# Patient Record
Sex: FEMALE | Race: WHITE | Employment: OTHER | URBAN - NONMETROPOLITAN AREA
[De-identification: names, ages, dates, MRNs, and addresses within clinical notes are randomized per-mention and may not be internally consistent; named-entity substitution may affect disease eponyms.]

---

## 2017-08-29 ENCOUNTER — HOSPITAL ENCOUNTER (INPATIENT)
Age: 62
LOS: 2 days | Discharge: NURSING, REHAB OR LONG-TERM CARE | DRG: 190 | End: 2017-09-01
Attending: EMERGENCY MEDICINE | Admitting: INTERNAL MEDICINE
Payer: MEDICARE

## 2017-08-29 ENCOUNTER — APPOINTMENT (OUTPATIENT)
Dept: CT IMAGING | Age: 62
DRG: 190 | End: 2017-08-29
Payer: MEDICARE

## 2017-08-29 ENCOUNTER — APPOINTMENT (OUTPATIENT)
Dept: GENERAL RADIOLOGY | Age: 62
DRG: 190 | End: 2017-08-29
Payer: MEDICARE

## 2017-08-29 DIAGNOSIS — J18.9 HCAP (HEALTHCARE-ASSOCIATED PNEUMONIA): Primary | ICD-10-CM

## 2017-08-29 DIAGNOSIS — Z86.59 HISTORY OF DEPRESSION: ICD-10-CM

## 2017-08-29 DIAGNOSIS — E03.9 HYPOTHYROIDISM, UNSPECIFIED TYPE: ICD-10-CM

## 2017-08-29 DIAGNOSIS — E87.20 LACTIC ACIDOSIS: ICD-10-CM

## 2017-08-29 DIAGNOSIS — H91.90 HEARING LOSS, UNSPECIFIED HEARING LOSS TYPE, UNSPECIFIED LATERALITY: ICD-10-CM

## 2017-08-29 DIAGNOSIS — N39.0 URINARY TRACT INFECTION WITHOUT HEMATURIA, SITE UNSPECIFIED: ICD-10-CM

## 2017-08-29 DIAGNOSIS — Z96.21 COCHLEAR IMPLANT IN PLACE: ICD-10-CM

## 2017-08-29 DIAGNOSIS — R10.31 ABDOMINAL PAIN, RIGHT LOWER QUADRANT: ICD-10-CM

## 2017-08-29 LAB
ALBUMIN SERPL-MCNC: 4.4 G/DL (ref 3.5–5.2)
ALP BLD-CCNC: 136 U/L (ref 35–104)
ALT SERPL-CCNC: 34 U/L (ref 5–33)
AMYLASE: 14 U/L (ref 28–100)
ANION GAP SERPL CALCULATED.3IONS-SCNC: 16 MMOL/L (ref 7–19)
AST SERPL-CCNC: 46 U/L (ref 5–32)
BACTERIA: NEGATIVE /HPF
BASE EXCESS ARTERIAL: -1.3 MMOL/L (ref -2–2)
BASOPHILS ABSOLUTE: 0 K/UL (ref 0–0.2)
BASOPHILS RELATIVE PERCENT: 0.2 % (ref 0–1)
BILIRUB SERPL-MCNC: 0.6 MG/DL (ref 0.2–1.2)
BILIRUBIN URINE: NEGATIVE
BLOOD, URINE: NEGATIVE
BUN BLDV-MCNC: 15 MG/DL (ref 8–23)
CALCIUM SERPL-MCNC: 9.5 MG/DL (ref 8.8–10.2)
CARBOXYHEMOGLOBIN ARTERIAL: 2.5 % (ref 0–5)
CHLORIDE BLD-SCNC: 98 MMOL/L (ref 98–111)
CLARITY: CLEAR
CO2: 25 MMOL/L (ref 22–29)
COLOR: YELLOW
CREAT SERPL-MCNC: 0.9 MG/DL (ref 0.5–0.9)
EOSINOPHILS ABSOLUTE: 0 K/UL (ref 0–0.6)
EOSINOPHILS RELATIVE PERCENT: 0.2 % (ref 0–5)
EPITHELIAL CELLS, UA: 1 /HPF (ref 0–5)
GFR NON-AFRICAN AMERICAN: >60
GLUCOSE BLD-MCNC: 121 MG/DL (ref 74–109)
GLUCOSE URINE: NEGATIVE MG/DL
HCO3 ARTERIAL: 21.3 MMOL/L (ref 22–26)
HCT VFR BLD CALC: 37.9 % (ref 37–47)
HEMOGLOBIN, ART, EXTENDED: 10.4 G/DL (ref 12–16)
HEMOGLOBIN: 12.2 G/DL (ref 12–16)
HYALINE CASTS: 0 /HPF (ref 0–8)
KETONES, URINE: NEGATIVE MG/DL
LACTIC ACID: 2.8 MG/DL (ref 0.5–1.9)
LEUKOCYTE ESTERASE, URINE: ABNORMAL
LIPASE: 35 U/L (ref 13–60)
LYMPHOCYTES ABSOLUTE: 1 K/UL (ref 1.1–4.5)
LYMPHOCYTES RELATIVE PERCENT: 12 % (ref 20–40)
MCH RBC QN AUTO: 32.2 PG (ref 27–31)
MCHC RBC AUTO-ENTMCNC: 32.2 G/DL (ref 33–37)
MCV RBC AUTO: 100 FL (ref 81–99)
METHEMOGLOBIN ARTERIAL: 1.1 %
MONOCYTES ABSOLUTE: 0.6 K/UL (ref 0–0.9)
MONOCYTES RELATIVE PERCENT: 7.4 % (ref 0–10)
NEUTROPHILS ABSOLUTE: 6.5 K/UL (ref 1.5–7.5)
NEUTROPHILS RELATIVE PERCENT: 79.5 % (ref 50–65)
NITRITE, URINE: NEGATIVE
O2 CONTENT ARTERIAL: 14 ML/DL
O2 SAT, ARTERIAL: 95.1 %
O2 THERAPY: ABNORMAL
PCO2 ARTERIAL: 28 MMHG (ref 35–45)
PDW BLD-RTO: 13.6 % (ref 11.5–14.5)
PH ARTERIAL: 7.49 (ref 7.35–7.45)
PH UA: 7
PLATELET # BLD: 208 K/UL (ref 130–400)
PMV BLD AUTO: 11.5 FL (ref 9.4–12.3)
PO2 ARTERIAL: 96 MMHG (ref 80–100)
POTASSIUM SERPL-SCNC: 3.7 MMOL/L (ref 3.5–5)
POTASSIUM, WHOLE BLOOD: 3.2
PROTEIN UA: NEGATIVE MG/DL
RBC # BLD: 3.79 M/UL (ref 4.2–5.4)
RBC UA: 2 /HPF (ref 0–4)
SODIUM BLD-SCNC: 139 MMOL/L (ref 136–145)
SPECIFIC GRAVITY UA: 1.02
TOTAL PROTEIN: 7.7 G/DL (ref 6.6–8.7)
TSH SERPL DL<=0.05 MIU/L-ACNC: 62.89 UIU/ML (ref 0.27–4.2)
UROBILINOGEN, URINE: 0.2 E.U./DL
VALPROIC ACID LEVEL: 45.3 UG/ML (ref 50–100)
WBC # BLD: 8.2 K/UL (ref 4.8–10.8)
WBC UA: 16 /HPF (ref 0–5)

## 2017-08-29 PROCEDURE — 87086 URINE CULTURE/COLONY COUNT: CPT

## 2017-08-29 PROCEDURE — 83690 ASSAY OF LIPASE: CPT

## 2017-08-29 PROCEDURE — 93005 ELECTROCARDIOGRAM TRACING: CPT

## 2017-08-29 PROCEDURE — 6370000000 HC RX 637 (ALT 250 FOR IP): Performed by: EMERGENCY MEDICINE

## 2017-08-29 PROCEDURE — 83605 ASSAY OF LACTIC ACID: CPT

## 2017-08-29 PROCEDURE — 99285 EMERGENCY DEPT VISIT HI MDM: CPT

## 2017-08-29 PROCEDURE — 81001 URINALYSIS AUTO W/SCOPE: CPT

## 2017-08-29 PROCEDURE — 6360000002 HC RX W HCPCS: Performed by: EMERGENCY MEDICINE

## 2017-08-29 PROCEDURE — 99284 EMERGENCY DEPT VISIT MOD MDM: CPT | Performed by: EMERGENCY MEDICINE

## 2017-08-29 PROCEDURE — 36415 COLL VENOUS BLD VENIPUNCTURE: CPT

## 2017-08-29 PROCEDURE — 71010 XR CHEST PORTABLE: CPT

## 2017-08-29 PROCEDURE — 2580000003 HC RX 258: Performed by: EMERGENCY MEDICINE

## 2017-08-29 PROCEDURE — 6360000004 HC RX CONTRAST MEDICATION: Performed by: EMERGENCY MEDICINE

## 2017-08-29 PROCEDURE — 36600 WITHDRAWAL OF ARTERIAL BLOOD: CPT

## 2017-08-29 PROCEDURE — 80164 ASSAY DIPROPYLACETIC ACD TOT: CPT

## 2017-08-29 PROCEDURE — 80053 COMPREHEN METABOLIC PANEL: CPT

## 2017-08-29 PROCEDURE — 85025 COMPLETE CBC W/AUTO DIFF WBC: CPT

## 2017-08-29 PROCEDURE — 74177 CT ABD & PELVIS W/CONTRAST: CPT

## 2017-08-29 PROCEDURE — 84443 ASSAY THYROID STIM HORMONE: CPT

## 2017-08-29 PROCEDURE — 82150 ASSAY OF AMYLASE: CPT

## 2017-08-29 RX ORDER — VANCOMYCIN HYDROCHLORIDE 1 G/200ML
1 INJECTION, SOLUTION INTRAVENOUS ONCE
Status: DISCONTINUED | OUTPATIENT
Start: 2017-08-29 | End: 2017-08-30 | Stop reason: DRUGHIGH

## 2017-08-29 RX ORDER — 0.9 % SODIUM CHLORIDE 0.9 %
1000 INTRAVENOUS SOLUTION INTRAVENOUS ONCE
Status: COMPLETED | OUTPATIENT
Start: 2017-08-29 | End: 2017-08-29

## 2017-08-29 RX ORDER — ACETAMINOPHEN 500 MG
1000 TABLET ORAL ONCE
Status: COMPLETED | OUTPATIENT
Start: 2017-08-29 | End: 2017-08-29

## 2017-08-29 RX ORDER — CIPROFLOXACIN 2 MG/ML
400 INJECTION, SOLUTION INTRAVENOUS EVERY 12 HOURS
Status: DISCONTINUED | OUTPATIENT
Start: 2017-08-29 | End: 2017-09-01 | Stop reason: HOSPADM

## 2017-08-29 RX ADMIN — SODIUM CHLORIDE 1000 ML: 9 INJECTION, SOLUTION INTRAVENOUS at 20:54

## 2017-08-29 RX ADMIN — ACETAMINOPHEN 1000 MG: 500 TABLET ORAL at 23:46

## 2017-08-29 RX ADMIN — IOVERSOL 90 ML: 741 INJECTION INTRA-ARTERIAL; INTRAVENOUS at 21:03

## 2017-08-29 RX ADMIN — TAZOBACTAM SODIUM AND PIPERACILLIN SODIUM 3.38 G: 375; 3 INJECTION, SOLUTION INTRAVENOUS at 23:49

## 2017-08-29 ASSESSMENT — ENCOUNTER SYMPTOMS
CONSTIPATION: 0
VOICE CHANGE: 0
SORE THROAT: 0
ABDOMINAL PAIN: 1
FACIAL SWELLING: 0
CHOKING: 0
DIARRHEA: 0
APNEA: 0
SINUS PRESSURE: 0
BLOOD IN STOOL: 0
NAUSEA: 0
EYE DISCHARGE: 0

## 2017-08-29 ASSESSMENT — PAIN SCALES - GENERAL: PAINLEVEL_OUTOF10: 4

## 2017-08-30 PROBLEM — R82.81 PYURIA: Status: ACTIVE | Noted: 2017-08-30

## 2017-08-30 PROBLEM — J18.9 PNEUMONIA OF LEFT LOWER LOBE DUE TO INFECTIOUS ORGANISM: Status: ACTIVE | Noted: 2017-08-30

## 2017-08-30 PROBLEM — E03.4 HYPOTHYROIDISM DUE TO ACQUIRED ATROPHY OF THYROID: Status: ACTIVE | Noted: 2017-08-30

## 2017-08-30 PROBLEM — J18.9 PNEUMONIA: Status: ACTIVE | Noted: 2017-08-30

## 2017-08-30 LAB — LACTIC ACID: 1 MG/DL (ref 0.5–1.9)

## 2017-08-30 PROCEDURE — 6360000002 HC RX W HCPCS: Performed by: EMERGENCY MEDICINE

## 2017-08-30 PROCEDURE — 6370000000 HC RX 637 (ALT 250 FOR IP): Performed by: INTERNAL MEDICINE

## 2017-08-30 PROCEDURE — 2580000003 HC RX 258: Performed by: INTERNAL MEDICINE

## 2017-08-30 PROCEDURE — 87070 CULTURE OTHR SPECIMN AEROBIC: CPT

## 2017-08-30 PROCEDURE — 99223 1ST HOSP IP/OBS HIGH 75: CPT | Performed by: INTERNAL MEDICINE

## 2017-08-30 PROCEDURE — 86403 PARTICLE AGGLUT ANTBDY SCRN: CPT

## 2017-08-30 PROCEDURE — 6360000002 HC RX W HCPCS: Performed by: INTERNAL MEDICINE

## 2017-08-30 PROCEDURE — 87040 BLOOD CULTURE FOR BACTERIA: CPT

## 2017-08-30 PROCEDURE — 83605 ASSAY OF LACTIC ACID: CPT

## 2017-08-30 PROCEDURE — 99999 PR OFFICE/OUTPT VISIT,PROCEDURE ONLY: CPT | Performed by: INTERNAL MEDICINE

## 2017-08-30 PROCEDURE — 36415 COLL VENOUS BLD VENIPUNCTURE: CPT

## 2017-08-30 PROCEDURE — 84132 ASSAY OF SERUM POTASSIUM: CPT

## 2017-08-30 PROCEDURE — G8996 SWALLOW CURRENT STATUS: HCPCS

## 2017-08-30 PROCEDURE — 92610 EVALUATE SWALLOWING FUNCTION: CPT

## 2017-08-30 PROCEDURE — 2700000000 HC OXYGEN THERAPY PER DAY

## 2017-08-30 PROCEDURE — 87185 SC STD ENZYME DETCJ PER NZM: CPT

## 2017-08-30 PROCEDURE — 82803 BLOOD GASES ANY COMBINATION: CPT

## 2017-08-30 PROCEDURE — G8997 SWALLOW GOAL STATUS: HCPCS

## 2017-08-30 PROCEDURE — 87205 SMEAR GRAM STAIN: CPT

## 2017-08-30 PROCEDURE — 1210000000 HC MED SURG R&B

## 2017-08-30 RX ORDER — FLUTICASONE PROPIONATE 50 MCG
2 SPRAY, SUSPENSION (ML) NASAL DAILY
Status: DISCONTINUED | OUTPATIENT
Start: 2017-08-30 | End: 2017-09-01 | Stop reason: HOSPADM

## 2017-08-30 RX ORDER — ALENDRONATE SODIUM 70 MG/1
70 TABLET ORAL
Status: ON HOLD | COMMUNITY
End: 2018-01-25 | Stop reason: HOSPADM

## 2017-08-30 RX ORDER — DULOXETIN HYDROCHLORIDE 30 MG/1
90 CAPSULE, DELAYED RELEASE ORAL DAILY
Status: DISCONTINUED | OUTPATIENT
Start: 2017-08-30 | End: 2017-09-01 | Stop reason: HOSPADM

## 2017-08-30 RX ORDER — GABAPENTIN 300 MG/1
600 CAPSULE ORAL 3 TIMES DAILY
Status: DISCONTINUED | OUTPATIENT
Start: 2017-08-30 | End: 2017-09-01 | Stop reason: HOSPADM

## 2017-08-30 RX ORDER — ONDANSETRON 2 MG/ML
4 INJECTION INTRAMUSCULAR; INTRAVENOUS EVERY 6 HOURS PRN
Status: DISCONTINUED | OUTPATIENT
Start: 2017-08-30 | End: 2017-09-01 | Stop reason: HOSPADM

## 2017-08-30 RX ORDER — ACETAMINOPHEN 325 MG/1
650 TABLET ORAL EVERY 4 HOURS PRN
Status: DISCONTINUED | OUTPATIENT
Start: 2017-08-30 | End: 2017-09-01 | Stop reason: HOSPADM

## 2017-08-30 RX ORDER — TRAMADOL HYDROCHLORIDE 50 MG/1
50 TABLET ORAL EVERY 6 HOURS PRN
Status: ON HOLD | COMMUNITY
End: 2017-09-01

## 2017-08-30 RX ORDER — DIVALPROEX SODIUM 500 MG/1
500 TABLET, EXTENDED RELEASE ORAL EVERY 12 HOURS SCHEDULED
Status: DISCONTINUED | OUTPATIENT
Start: 2017-08-30 | End: 2017-09-01 | Stop reason: HOSPADM

## 2017-08-30 RX ORDER — SODIUM CHLORIDE 0.9 % (FLUSH) 0.9 %
10 SYRINGE (ML) INJECTION PRN
Status: DISCONTINUED | OUTPATIENT
Start: 2017-08-30 | End: 2017-09-01 | Stop reason: HOSPADM

## 2017-08-30 RX ORDER — SERTRALINE HYDROCHLORIDE 100 MG/1
100 TABLET, FILM COATED ORAL DAILY
Status: ON HOLD | COMMUNITY
End: 2017-09-01 | Stop reason: HOSPADM

## 2017-08-30 RX ORDER — SODIUM CHLORIDE 0.9 % (FLUSH) 0.9 %
10 SYRINGE (ML) INJECTION EVERY 12 HOURS SCHEDULED
Status: DISCONTINUED | OUTPATIENT
Start: 2017-08-30 | End: 2017-09-01 | Stop reason: HOSPADM

## 2017-08-30 RX ORDER — LEVOTHYROXINE SODIUM 0.1 MG/1
100 TABLET ORAL DAILY
Status: DISCONTINUED | OUTPATIENT
Start: 2017-08-30 | End: 2017-09-01 | Stop reason: HOSPADM

## 2017-08-30 RX ORDER — FOLIC ACID 1 MG/1
1000 TABLET ORAL DAILY
Status: DISCONTINUED | OUTPATIENT
Start: 2017-08-30 | End: 2017-09-01 | Stop reason: HOSPADM

## 2017-08-30 RX ORDER — TRAMADOL HYDROCHLORIDE 50 MG/1
50 TABLET ORAL EVERY 6 HOURS PRN
Status: DISCONTINUED | OUTPATIENT
Start: 2017-08-30 | End: 2017-09-01 | Stop reason: HOSPADM

## 2017-08-30 RX ORDER — TRAZODONE HYDROCHLORIDE 50 MG/1
50 TABLET ORAL NIGHTLY PRN
Status: DISCONTINUED | OUTPATIENT
Start: 2017-08-30 | End: 2017-09-01 | Stop reason: HOSPADM

## 2017-08-30 RX ORDER — SODIUM CHLORIDE AND POTASSIUM CHLORIDE .9; .15 G/100ML; G/100ML
SOLUTION INTRAVENOUS CONTINUOUS
Status: DISPENSED | OUTPATIENT
Start: 2017-08-30 | End: 2017-08-31

## 2017-08-30 RX ORDER — ALENDRONATE SODIUM 70 MG/1
70 TABLET ORAL
Status: DISCONTINUED | OUTPATIENT
Start: 2017-08-30 | End: 2017-08-30 | Stop reason: CLARIF

## 2017-08-30 RX ORDER — CLONAZEPAM 1 MG/1
1 TABLET ORAL 3 TIMES DAILY
Status: DISCONTINUED | OUTPATIENT
Start: 2017-08-30 | End: 2017-09-01 | Stop reason: HOSPADM

## 2017-08-30 RX ORDER — VANCOMYCIN HYDROCHLORIDE 1 G/200ML
1000 INJECTION, SOLUTION INTRAVENOUS EVERY 24 HOURS
Status: DISCONTINUED | OUTPATIENT
Start: 2017-08-30 | End: 2017-09-01 | Stop reason: HOSPADM

## 2017-08-30 RX ORDER — FENOFIBRATE 145 MG/1
145 TABLET, COATED ORAL DAILY
COMMUNITY
End: 2018-01-23

## 2017-08-30 RX ORDER — ATORVASTATIN CALCIUM 40 MG/1
40 TABLET, FILM COATED ORAL DAILY
Status: DISCONTINUED | OUTPATIENT
Start: 2017-08-30 | End: 2017-09-01 | Stop reason: HOSPADM

## 2017-08-30 RX ORDER — CYCLOBENZAPRINE HCL 10 MG
10 TABLET ORAL DAILY
Status: DISCONTINUED | OUTPATIENT
Start: 2017-08-30 | End: 2017-09-01 | Stop reason: HOSPADM

## 2017-08-30 RX ADMIN — SODIUM CHLORIDE AND POTASSIUM CHLORIDE: 9; 1.49 INJECTION, SOLUTION INTRAVENOUS at 20:03

## 2017-08-30 RX ADMIN — CIPROFLOXACIN 400 MG: 2 INJECTION, SOLUTION INTRAVENOUS at 12:56

## 2017-08-30 RX ADMIN — LEVOTHYROXINE SODIUM 100 MCG: 100 TABLET ORAL at 06:44

## 2017-08-30 RX ADMIN — ATORVASTATIN CALCIUM 40 MG: 40 TABLET, FILM COATED ORAL at 10:29

## 2017-08-30 RX ADMIN — GABAPENTIN 600 MG: 300 CAPSULE ORAL at 10:29

## 2017-08-30 RX ADMIN — FOLIC ACID 1000 MCG: 1 TABLET ORAL at 10:29

## 2017-08-30 RX ADMIN — CLONAZEPAM 1 MG: 1 TABLET ORAL at 14:47

## 2017-08-30 RX ADMIN — TAZOBACTAM SODIUM AND PIPERACILLIN SODIUM 3.38 G: 375; 3 INJECTION, SOLUTION INTRAVENOUS at 06:10

## 2017-08-30 RX ADMIN — CLONAZEPAM 1 MG: 1 TABLET ORAL at 10:29

## 2017-08-30 RX ADMIN — FLUTICASONE PROPIONATE 2 SPRAY: 50 SPRAY, METERED NASAL at 10:27

## 2017-08-30 RX ADMIN — DULOXETINE HYDROCHLORIDE 90 MG: 30 CAPSULE, DELAYED RELEASE ORAL at 10:29

## 2017-08-30 RX ADMIN — ACETAMINOPHEN 650 MG: 325 TABLET, FILM COATED ORAL at 04:47

## 2017-08-30 RX ADMIN — GABAPENTIN 600 MG: 300 CAPSULE ORAL at 20:23

## 2017-08-30 RX ADMIN — DIVALPROEX SODIUM 500 MG: 500 TABLET, FILM COATED, EXTENDED RELEASE ORAL at 20:23

## 2017-08-30 RX ADMIN — SODIUM CHLORIDE AND POTASSIUM CHLORIDE: 9; 1.49 INJECTION, SOLUTION INTRAVENOUS at 10:26

## 2017-08-30 RX ADMIN — SERTRALINE HYDROCHLORIDE 100 MG: 50 TABLET ORAL at 10:29

## 2017-08-30 RX ADMIN — ENOXAPARIN SODIUM 40 MG: 40 INJECTION SUBCUTANEOUS at 10:28

## 2017-08-30 RX ADMIN — GABAPENTIN 600 MG: 300 CAPSULE ORAL at 14:47

## 2017-08-30 RX ADMIN — VANCOMYCIN HYDROCHLORIDE 1000 MG: 1 INJECTION, SOLUTION INTRAVENOUS at 04:00

## 2017-08-30 RX ADMIN — IBUPROFEN 600 MG: 200 TABLET, FILM COATED ORAL at 06:43

## 2017-08-30 RX ADMIN — CIPROFLOXACIN 400 MG: 2 INJECTION, SOLUTION INTRAVENOUS at 00:56

## 2017-08-30 RX ADMIN — CEFEPIME 2 G: 2 INJECTION, POWDER, FOR SOLUTION INTRAMUSCULAR; INTRAVENOUS at 17:00

## 2017-08-30 RX ADMIN — DIVALPROEX SODIUM 500 MG: 500 TABLET, FILM COATED, EXTENDED RELEASE ORAL at 10:28

## 2017-08-30 RX ADMIN — CEFEPIME 2 G: 2 INJECTION, POWDER, FOR SOLUTION INTRAMUSCULAR; INTRAVENOUS at 05:31

## 2017-08-30 RX ADMIN — CLONAZEPAM 1 MG: 1 TABLET ORAL at 20:24

## 2017-08-30 RX ADMIN — TRAZODONE HYDROCHLORIDE 50 MG: 50 TABLET ORAL at 21:36

## 2017-08-30 RX ADMIN — CYCLOBENZAPRINE HYDROCHLORIDE 10 MG: 10 TABLET, FILM COATED ORAL at 10:29

## 2017-08-30 ASSESSMENT — PAIN SCALES - GENERAL
PAINLEVEL_OUTOF10: 0
PAINLEVEL_OUTOF10: 4

## 2017-08-31 LAB
ANION GAP SERPL CALCULATED.3IONS-SCNC: 11 MMOL/L (ref 7–19)
BASOPHILS ABSOLUTE: 0 K/UL (ref 0–0.2)
BASOPHILS RELATIVE PERCENT: 0.3 % (ref 0–1)
BUN BLDV-MCNC: 9 MG/DL (ref 8–23)
CALCIUM SERPL-MCNC: 8.7 MG/DL (ref 8.8–10.2)
CHLORIDE BLD-SCNC: 111 MMOL/L (ref 98–111)
CO2: 22 MMOL/L (ref 22–29)
CREAT SERPL-MCNC: 0.8 MG/DL (ref 0.5–0.9)
EOSINOPHILS ABSOLUTE: 0.1 K/UL (ref 0–0.6)
EOSINOPHILS RELATIVE PERCENT: 1 % (ref 0–5)
GFR NON-AFRICAN AMERICAN: >60
GLUCOSE BLD-MCNC: 78 MG/DL (ref 74–109)
HCT VFR BLD CALC: 28.5 % (ref 37–47)
HEMOGLOBIN: 9.1 G/DL (ref 12–16)
LYMPHOCYTES ABSOLUTE: 2 K/UL (ref 1.1–4.5)
LYMPHOCYTES RELATIVE PERCENT: 25.5 % (ref 20–40)
MCH RBC QN AUTO: 31.4 PG (ref 27–31)
MCHC RBC AUTO-ENTMCNC: 31.9 G/DL (ref 33–37)
MCV RBC AUTO: 98.3 FL (ref 81–99)
MONOCYTES ABSOLUTE: 0.6 K/UL (ref 0–0.9)
MONOCYTES RELATIVE PERCENT: 7.8 % (ref 0–10)
NEUTROPHILS ABSOLUTE: 5.1 K/UL (ref 1.5–7.5)
NEUTROPHILS RELATIVE PERCENT: 64.8 % (ref 50–65)
PDW BLD-RTO: 14.1 % (ref 11.5–14.5)
PLATELET # BLD: 145 K/UL (ref 130–400)
PMV BLD AUTO: 10.6 FL (ref 9.4–12.3)
POTASSIUM SERPL-SCNC: 4 MMOL/L (ref 3.5–5)
RBC # BLD: 2.9 M/UL (ref 4.2–5.4)
SODIUM BLD-SCNC: 144 MMOL/L (ref 136–145)
URINE CULTURE, ROUTINE: NORMAL
WBC # BLD: 7.9 K/UL (ref 4.8–10.8)

## 2017-08-31 PROCEDURE — 92526 ORAL FUNCTION THERAPY: CPT

## 2017-08-31 PROCEDURE — 2700000000 HC OXYGEN THERAPY PER DAY

## 2017-08-31 PROCEDURE — 2580000003 HC RX 258: Performed by: INTERNAL MEDICINE

## 2017-08-31 PROCEDURE — 85025 COMPLETE CBC W/AUTO DIFF WBC: CPT

## 2017-08-31 PROCEDURE — 80048 BASIC METABOLIC PNL TOTAL CA: CPT

## 2017-08-31 PROCEDURE — 6360000002 HC RX W HCPCS: Performed by: INTERNAL MEDICINE

## 2017-08-31 PROCEDURE — 6360000002 HC RX W HCPCS: Performed by: EMERGENCY MEDICINE

## 2017-08-31 PROCEDURE — 1210000000 HC MED SURG R&B

## 2017-08-31 PROCEDURE — G8979 MOBILITY GOAL STATUS: HCPCS

## 2017-08-31 PROCEDURE — 99232 SBSQ HOSP IP/OBS MODERATE 35: CPT | Performed by: INTERNAL MEDICINE

## 2017-08-31 PROCEDURE — 97162 PT EVAL MOD COMPLEX 30 MIN: CPT

## 2017-08-31 PROCEDURE — G8978 MOBILITY CURRENT STATUS: HCPCS

## 2017-08-31 PROCEDURE — 6370000000 HC RX 637 (ALT 250 FOR IP): Performed by: INTERNAL MEDICINE

## 2017-08-31 PROCEDURE — 36415 COLL VENOUS BLD VENIPUNCTURE: CPT

## 2017-08-31 RX ADMIN — CIPROFLOXACIN 400 MG: 2 INJECTION, SOLUTION INTRAVENOUS at 12:41

## 2017-08-31 RX ADMIN — GABAPENTIN 600 MG: 300 CAPSULE ORAL at 13:24

## 2017-08-31 RX ADMIN — ENOXAPARIN SODIUM 40 MG: 40 INJECTION SUBCUTANEOUS at 09:01

## 2017-08-31 RX ADMIN — CLONAZEPAM 1 MG: 1 TABLET ORAL at 09:01

## 2017-08-31 RX ADMIN — CEFEPIME 2 G: 2 INJECTION, POWDER, FOR SOLUTION INTRAMUSCULAR; INTRAVENOUS at 15:51

## 2017-08-31 RX ADMIN — SERTRALINE HYDROCHLORIDE 100 MG: 50 TABLET ORAL at 09:01

## 2017-08-31 RX ADMIN — Medication 10 ML: at 20:52

## 2017-08-31 RX ADMIN — CIPROFLOXACIN 400 MG: 2 INJECTION, SOLUTION INTRAVENOUS at 00:22

## 2017-08-31 RX ADMIN — TRAMADOL HYDROCHLORIDE 50 MG: 50 TABLET, FILM COATED ORAL at 04:24

## 2017-08-31 RX ADMIN — CLONAZEPAM 1 MG: 1 TABLET ORAL at 13:24

## 2017-08-31 RX ADMIN — VANCOMYCIN HYDROCHLORIDE 1000 MG: 1 INJECTION, SOLUTION INTRAVENOUS at 03:29

## 2017-08-31 RX ADMIN — ATORVASTATIN CALCIUM 40 MG: 40 TABLET, FILM COATED ORAL at 09:02

## 2017-08-31 RX ADMIN — FOLIC ACID 1000 MCG: 1 TABLET ORAL at 09:02

## 2017-08-31 RX ADMIN — LEVOTHYROXINE SODIUM 100 MCG: 100 TABLET ORAL at 04:24

## 2017-08-31 RX ADMIN — TRAMADOL HYDROCHLORIDE 50 MG: 50 TABLET, FILM COATED ORAL at 13:25

## 2017-08-31 RX ADMIN — CYCLOBENZAPRINE HYDROCHLORIDE 10 MG: 10 TABLET, FILM COATED ORAL at 09:02

## 2017-08-31 RX ADMIN — GABAPENTIN 600 MG: 300 CAPSULE ORAL at 09:01

## 2017-08-31 RX ADMIN — DIVALPROEX SODIUM 500 MG: 500 TABLET, FILM COATED, EXTENDED RELEASE ORAL at 09:02

## 2017-08-31 RX ADMIN — CLONAZEPAM 1 MG: 1 TABLET ORAL at 20:50

## 2017-08-31 RX ADMIN — TRAMADOL HYDROCHLORIDE 50 MG: 50 TABLET, FILM COATED ORAL at 20:50

## 2017-08-31 RX ADMIN — DULOXETINE HYDROCHLORIDE 90 MG: 30 CAPSULE, DELAYED RELEASE ORAL at 09:01

## 2017-08-31 RX ADMIN — CEFEPIME 2 G: 2 INJECTION, POWDER, FOR SOLUTION INTRAMUSCULAR; INTRAVENOUS at 05:03

## 2017-08-31 RX ADMIN — DIVALPROEX SODIUM 500 MG: 500 TABLET, FILM COATED, EXTENDED RELEASE ORAL at 20:51

## 2017-08-31 RX ADMIN — GABAPENTIN 600 MG: 300 CAPSULE ORAL at 20:51

## 2017-08-31 ASSESSMENT — PAIN DESCRIPTION - ORIENTATION: ORIENTATION: RIGHT

## 2017-08-31 ASSESSMENT — PAIN DESCRIPTION - LOCATION: LOCATION: BACK;HIP

## 2017-08-31 ASSESSMENT — PAIN SCALES - GENERAL
PAINLEVEL_OUTOF10: 2
PAINLEVEL_OUTOF10: 8
PAINLEVEL_OUTOF10: 6
PAINLEVEL_OUTOF10: 4
PAINLEVEL_OUTOF10: 6
PAINLEVEL_OUTOF10: 8

## 2017-08-31 ASSESSMENT — PAIN DESCRIPTION - PAIN TYPE: TYPE: CHRONIC PAIN

## 2017-09-01 VITALS
SYSTOLIC BLOOD PRESSURE: 100 MMHG | DIASTOLIC BLOOD PRESSURE: 72 MMHG | WEIGHT: 150 LBS | HEIGHT: 64 IN | TEMPERATURE: 97.2 F | HEART RATE: 74 BPM | RESPIRATION RATE: 16 BRPM | BODY MASS INDEX: 25.61 KG/M2 | OXYGEN SATURATION: 96 %

## 2017-09-01 PROBLEM — D64.9 ANEMIA: Status: ACTIVE | Noted: 2017-09-01

## 2017-09-01 PROBLEM — I95.9 HYPOTENSION: Status: ACTIVE | Noted: 2017-09-01

## 2017-09-01 LAB — STREP PNEUMONIAE ANTIGEN, URINE: NORMAL

## 2017-09-01 PROCEDURE — 6360000002 HC RX W HCPCS: Performed by: INTERNAL MEDICINE

## 2017-09-01 PROCEDURE — 2580000003 HC RX 258: Performed by: INTERNAL MEDICINE

## 2017-09-01 PROCEDURE — 6370000000 HC RX 637 (ALT 250 FOR IP): Performed by: INTERNAL MEDICINE

## 2017-09-01 PROCEDURE — 6360000002 HC RX W HCPCS: Performed by: EMERGENCY MEDICINE

## 2017-09-01 PROCEDURE — 87449 NOS EACH ORGANISM AG IA: CPT

## 2017-09-01 PROCEDURE — 99239 HOSP IP/OBS DSCHRG MGMT >30: CPT | Performed by: INTERNAL MEDICINE

## 2017-09-01 RX ORDER — LEVOTHYROXINE SODIUM 0.1 MG/1
100 TABLET ORAL DAILY
Qty: 30 TABLET | Refills: 3 | Status: SHIPPED | OUTPATIENT
Start: 2017-09-01

## 2017-09-01 RX ORDER — LOPERAMIDE HYDROCHLORIDE 2 MG/1
2 CAPSULE ORAL 4 TIMES DAILY PRN
Qty: 20 CAPSULE | Refills: 0 | Status: SHIPPED | OUTPATIENT
Start: 2017-09-01 | End: 2018-01-23

## 2017-09-01 RX ORDER — GABAPENTIN 300 MG/1
600 CAPSULE ORAL 3 TIMES DAILY
Qty: 60 CAPSULE | Refills: 0 | Status: ON HOLD | OUTPATIENT
Start: 2017-09-01 | End: 2018-01-25

## 2017-09-01 RX ORDER — CLONAZEPAM 1 MG/1
1 TABLET ORAL 3 TIMES DAILY
Qty: 60 TABLET | Refills: 0 | Status: SHIPPED | OUTPATIENT
Start: 2017-09-01 | End: 2017-11-04

## 2017-09-01 RX ORDER — LEVOFLOXACIN 500 MG/1
500 TABLET, FILM COATED ORAL DAILY
Qty: 20 TABLET | Refills: 0 | Status: SHIPPED | OUTPATIENT
Start: 2017-09-01 | End: 2017-09-11

## 2017-09-01 RX ORDER — TRAMADOL HYDROCHLORIDE 50 MG/1
50 TABLET ORAL EVERY 6 HOURS PRN
Qty: 20 TABLET | Refills: 0 | Status: SHIPPED | OUTPATIENT
Start: 2017-09-01 | End: 2018-01-23

## 2017-09-01 RX ADMIN — DIVALPROEX SODIUM 500 MG: 500 TABLET, FILM COATED, EXTENDED RELEASE ORAL at 08:25

## 2017-09-01 RX ADMIN — DULOXETINE HYDROCHLORIDE 90 MG: 30 CAPSULE, DELAYED RELEASE ORAL at 08:25

## 2017-09-01 RX ADMIN — GABAPENTIN 600 MG: 300 CAPSULE ORAL at 08:25

## 2017-09-01 RX ADMIN — FLUTICASONE PROPIONATE 2 SPRAY: 50 SPRAY, METERED NASAL at 08:33

## 2017-09-01 RX ADMIN — CYCLOBENZAPRINE HYDROCHLORIDE 10 MG: 10 TABLET, FILM COATED ORAL at 08:25

## 2017-09-01 RX ADMIN — CLONAZEPAM 1 MG: 1 TABLET ORAL at 08:25

## 2017-09-01 RX ADMIN — ATORVASTATIN CALCIUM 40 MG: 40 TABLET, FILM COATED ORAL at 08:25

## 2017-09-01 RX ADMIN — CEFEPIME 2 G: 2 INJECTION, POWDER, FOR SOLUTION INTRAMUSCULAR; INTRAVENOUS at 04:23

## 2017-09-01 RX ADMIN — SERTRALINE HYDROCHLORIDE 100 MG: 50 TABLET ORAL at 08:25

## 2017-09-01 RX ADMIN — Medication 10 ML: at 08:30

## 2017-09-01 RX ADMIN — LEVOTHYROXINE SODIUM 100 MCG: 100 TABLET ORAL at 05:55

## 2017-09-01 RX ADMIN — ENOXAPARIN SODIUM 40 MG: 40 INJECTION SUBCUTANEOUS at 08:26

## 2017-09-01 RX ADMIN — VANCOMYCIN HYDROCHLORIDE 1000 MG: 1 INJECTION, SOLUTION INTRAVENOUS at 03:16

## 2017-09-01 RX ADMIN — FOLIC ACID 1000 MCG: 1 TABLET ORAL at 08:25

## 2017-09-01 RX ADMIN — TRAMADOL HYDROCHLORIDE 50 MG: 50 TABLET, FILM COATED ORAL at 05:57

## 2017-09-01 RX ADMIN — CIPROFLOXACIN 400 MG: 2 INJECTION, SOLUTION INTRAVENOUS at 00:16

## 2017-09-01 ASSESSMENT — PAIN SCALES - GENERAL: PAINLEVEL_OUTOF10: 6

## 2017-09-04 LAB
BLOOD CULTURE, ROUTINE: NORMAL
CULTURE, BLOOD 2: NORMAL
CULTURE, RESPIRATORY: ABNORMAL
GRAM STAIN RESULT: ABNORMAL
ORGANISM: ABNORMAL
ORGANISM: ABNORMAL

## 2017-09-06 LAB
EKG P AXIS: 78 DEGREES
EKG P-R INTERVAL: 137 MS
EKG Q-T INTERVAL: 299 MS
EKG QRS DURATION: 72 MS
EKG QTC CALCULATION (BAZETT): 449 MS
EKG T AXIS: 75 DEGREES

## 2017-11-04 ENCOUNTER — HOSPITAL ENCOUNTER (EMERGENCY)
Age: 62
Discharge: HOME | End: 2017-11-04
Attending: EMERGENCY MEDICINE
Payer: MEDICARE

## 2017-11-04 ENCOUNTER — APPOINTMENT (OUTPATIENT)
Dept: CT IMAGING | Age: 62
End: 2017-11-04
Payer: MEDICARE

## 2017-11-04 VITALS
SYSTOLIC BLOOD PRESSURE: 110 MMHG | DIASTOLIC BLOOD PRESSURE: 62 MMHG | RESPIRATION RATE: 18 BRPM | HEART RATE: 83 BPM | TEMPERATURE: 98.8 F | OXYGEN SATURATION: 93 %

## 2017-11-04 DIAGNOSIS — R74.8 ALKALINE PHOSPHATASE ELEVATION: Primary | ICD-10-CM

## 2017-11-04 DIAGNOSIS — R46.89 AGGRESSIVE BEHAVIOR: ICD-10-CM

## 2017-11-04 DIAGNOSIS — R44.3 HALLUCINATIONS: ICD-10-CM

## 2017-11-04 DIAGNOSIS — R74.8 LIVER ENZYME ELEVATION: ICD-10-CM

## 2017-11-04 LAB
ACETAMINOPHEN LEVEL: <15 UG/ML
ALBUMIN SERPL-MCNC: 4.3 G/DL (ref 3.5–5.2)
ALP BLD-CCNC: 167 U/L (ref 35–104)
ALT SERPL-CCNC: 22 U/L (ref 5–33)
AMPHETAMINE SCREEN, URINE: NEGATIVE
ANION GAP SERPL CALCULATED.3IONS-SCNC: 13 MMOL/L (ref 7–19)
AST SERPL-CCNC: 39 U/L (ref 5–32)
BARBITURATE SCREEN URINE: NEGATIVE
BENZODIAZEPINE SCREEN, URINE: NEGATIVE
BILIRUB SERPL-MCNC: 0.3 MG/DL (ref 0.2–1.2)
BILIRUBIN URINE: NEGATIVE
BLOOD, URINE: NEGATIVE
BUN BLDV-MCNC: 13 MG/DL (ref 8–23)
CALCIUM SERPL-MCNC: 10.2 MG/DL (ref 8.8–10.2)
CANNABINOID SCREEN URINE: NEGATIVE
CHLORIDE BLD-SCNC: 98 MMOL/L (ref 98–111)
CLARITY: CLEAR
CO2: 31 MMOL/L (ref 22–29)
COCAINE METABOLITE SCREEN URINE: NEGATIVE
COLOR: YELLOW
CREAT SERPL-MCNC: 0.8 MG/DL (ref 0.5–0.9)
ETHANOL: <10 MG/DL (ref 0–0.08)
GFR NON-AFRICAN AMERICAN: >60
GLUCOSE BLD-MCNC: 86 MG/DL (ref 74–109)
GLUCOSE URINE: NEGATIVE MG/DL
HCT VFR BLD CALC: 37.4 % (ref 37–47)
HEMOGLOBIN: 12.2 G/DL (ref 12–16)
KETONES, URINE: NEGATIVE MG/DL
LEUKOCYTE ESTERASE, URINE: NEGATIVE
Lab: NORMAL
MCH RBC QN AUTO: 31 PG (ref 27–31)
MCHC RBC AUTO-ENTMCNC: 32.6 G/DL (ref 33–37)
MCV RBC AUTO: 94.9 FL (ref 81–99)
NITRITE, URINE: NEGATIVE
OPIATE SCREEN URINE: NEGATIVE
PDW BLD-RTO: 13.4 % (ref 11.5–14.5)
PH UA: 7
PLATELET # BLD: 182 K/UL (ref 130–400)
PMV BLD AUTO: 9.8 FL (ref 9.4–12.3)
POTASSIUM SERPL-SCNC: 4.9 MMOL/L (ref 3.5–5)
PROTEIN UA: NEGATIVE MG/DL
RBC # BLD: 3.94 M/UL (ref 4.2–5.4)
SALICYLATE, SERUM: <3 MG/DL (ref 3–10)
SODIUM BLD-SCNC: 142 MMOL/L (ref 136–145)
SPECIFIC GRAVITY UA: 1.01
TOTAL PROTEIN: 7.7 G/DL (ref 6.6–8.7)
UROBILINOGEN, URINE: 0.2 E.U./DL
VALPROIC ACID LEVEL: 37.6 UG/ML (ref 50–100)
WBC # BLD: 4.9 K/UL (ref 4.8–10.8)

## 2017-11-04 PROCEDURE — G0480 DRUG TEST DEF 1-7 CLASSES: HCPCS

## 2017-11-04 PROCEDURE — 85027 COMPLETE CBC AUTOMATED: CPT

## 2017-11-04 PROCEDURE — 81003 URINALYSIS AUTO W/O SCOPE: CPT

## 2017-11-04 PROCEDURE — 80053 COMPREHEN METABOLIC PANEL: CPT

## 2017-11-04 PROCEDURE — 70450 CT HEAD/BRAIN W/O DYE: CPT

## 2017-11-04 PROCEDURE — 99284 EMERGENCY DEPT VISIT MOD MDM: CPT | Performed by: EMERGENCY MEDICINE

## 2017-11-04 PROCEDURE — 99285 EMERGENCY DEPT VISIT HI MDM: CPT

## 2017-11-04 PROCEDURE — 80307 DRUG TEST PRSMV CHEM ANLYZR: CPT

## 2017-11-04 PROCEDURE — 80164 ASSAY DIPROPYLACETIC ACD TOT: CPT

## 2017-11-04 PROCEDURE — 36415 COLL VENOUS BLD VENIPUNCTURE: CPT

## 2017-11-04 RX ORDER — ALBUTEROL SULFATE 90 UG/1
2 AEROSOL, METERED RESPIRATORY (INHALATION) 4 TIMES DAILY
COMMUNITY

## 2017-11-04 RX ORDER — SERTRALINE HYDROCHLORIDE 100 MG/1
100 TABLET, FILM COATED ORAL DAILY
COMMUNITY

## 2017-11-04 RX ORDER — ALBUTEROL SULFATE 2.5 MG/3ML
2.5 SOLUTION RESPIRATORY (INHALATION) 4 TIMES DAILY
COMMUNITY
End: 2018-01-23

## 2017-11-04 RX ORDER — ONDANSETRON 4 MG/1
4 TABLET, ORALLY DISINTEGRATING ORAL 3 TIMES DAILY PRN
COMMUNITY
End: 2018-01-23

## 2017-11-04 RX ORDER — CLONAZEPAM 0.5 MG/1
0.5 TABLET ORAL SEE ADMIN INSTRUCTIONS
COMMUNITY
End: 2018-01-23

## 2017-11-04 ASSESSMENT — ENCOUNTER SYMPTOMS
ABDOMINAL PAIN: 0
VOMITING: 0
DIARRHEA: 0
SHORTNESS OF BREATH: 0
EYE PAIN: 0

## 2017-11-04 NOTE — ED PROVIDER NOTES
[facility] EMERGENCY DEPT  eMERGENCY dEPARTMENT eNCOUnter      Pt Name: Annalise Guzman  MRN: [id number]  [place] 1955  Date of evaluation: 11/4/2017  Provider: Karl Roger MD    CHIEF COMPLAINT       Chief Complaint   Patient presents with    Mental Health Problem     Hallucinations         HISTORY OF PRESENT ILLNESS   (Location/Symptom, Timing/Onset, Context/Setting, Quality, Duration, Modifying Factors, Severity)  Note limiting factors. Annalise Guzman is a 65 y.o. female who presents to the emergency department For worsening aggressive behavior and hallucinations. Patient is nursing home resident. She has a history of mental health problems in the past. Nursing home states over the past 2 months she's had increasing problems with aggression and hallucinations. Patient is very hard of hearing. She really cannot understand anything I say. She had a cochlear implant that was lost a few months ago and since then really has not been able to hear. I'm sure this has quite a bit to do with why she is increasingly angry and agitated. Nursing home states that patient has been banging side of her head against things trying to get the implant to work and has indicated to them that she thinks this is a phone that people are communicating to her through. She has told them that she thinks that her nephew drowned under a bridge. She is not voicing this to me. Patient unable to understand anything I say  so I was only able to communicate with her by writing things down on a dry erase board which she was able to read without any problems. She is alert and oriented to person place and time. She has no complaints of any kind. She denies wanting to hurt herself or anyone else although she said earlier she was a bit agitated at the nursing home and wanted to hurt the caregivers there but said she no longer wants to do this. They said that she threatened to break a window while she was there.  She has no complaints at all except for some lower back discomfort but she said this is chronic and unchanged from baseline. She does have a history of suicide attempts in the past but she is denying suicidal ideation to me at this time. HPI    Nursing Notes were reviewed. REVIEW OF SYSTEMS    (2-9 systems for level 4, 10 or more for level 5)     Review of Systems   Constitutional: Negative for fever. Eyes: Negative for pain. Respiratory: Negative for shortness of breath. Cardiovascular: Negative for chest pain and palpitations. Gastrointestinal: Negative for abdominal pain, diarrhea and vomiting. Genitourinary: Negative for dysuria. Skin: Negative for rash. Neurological: Negative for weakness and headaches. Psychiatric/Behavioral: Positive for behavioral problems, dysphoric mood and hallucinations. Negative for suicidal ideas. All other systems reviewed and are negative. A complete review of systems was performed and is negative except as noted above in the HPI.        PAST MEDICAL HISTORY     Past Medical History:   Diagnosis Date    Chronic back pain     COPD (chronic obstructive pulmonary disease) ([facility] [address]     Depression     Fetal alcohol syndrome     Fibromyalgia     GERD (gastroesophageal reflux disease)     Hearing loss     Hyperlipidemia     Hypothyroidism     Neck pain     states has pinched nerve in neck    Palliative care encounter          SURGICAL HISTORY       Past Surgical History:   Procedure Laterality Date    BREAST SURGERY      right biopsy benign    COCHLEAR IMPLANT      COLONOSCOPY      FRACTURE SURGERY      right hip fracture    HYSTERECTOMY      UPPER GASTROINTESTINAL ENDOSCOPY           CURRENT MEDICATIONS       Previous Medications    ACETAMINOPHEN (TYLENOL) 325 MG TABLET    Take 2 tablets by mouth every 4 hours as needed for Pain    ALENDRONATE (FOSAMAX) 70 MG TABLET    Take 70 mg by mouth every 7 days mondays    ATORVASTATIN (LIPITOR) 40 MG TABLET    TAKE ONE TABLET BY MOUTH Height Weight   122/72 98.7 °F (37.1 °C) Oral 95 18 93 % -- --       Physical Exam   Constitutional: She is oriented to person, place, and time. She appears well-developed. No distress. HENT:   Head: Normocephalic and atraumatic. Eyes: Pupils are equal, round, and reactive to light. No scleral icterus. Neck: Normal range of motion. Neck supple. No JVD present. Cardiovascular: Normal rate, regular rhythm, normal heart sounds and intact distal pulses. Pulmonary/Chest: Effort normal and breath sounds normal. No respiratory distress. Abdominal: Soft. She exhibits no distension. There is no tenderness. Musculoskeletal: She exhibits no edema or tenderness. Neurological: She is alert and oriented to person, place, and time. Skin: Skin is warm and dry. Psychiatric: She has a normal mood and affect. Her speech is normal and behavior is normal. Thought content normal.   Vitals reviewed. DIAGNOSTIC RESULTS     RADIOLOGY:   Non-plain film images such as CT, Ultrasound and MRI are read by the radiologist. Plain radiographic images are visualized and preliminarily interpreted by the emergency physician with the below findings:    Interpretation per the Radiologist below, if available at the time of this note:    CT Head WO Contrast   Final Result   1. No acute intracranial process.    Signed by Dr Deondre Stanley on 11/4/2017 1:48 PM        LABS:  Labs Reviewed   CBC - Abnormal; Notable for the following:        Result Value    RBC 3.94 (*)     MCHC 32.6 (*)     All other components within normal limits   COMPREHENSIVE METABOLIC PANEL - Abnormal; Notable for the following:     CO2 31 (*)     Alkaline Phosphatase 167 (*)     AST 39 (*)     All other components within normal limits   VALPROIC ACID LEVEL, TOTAL - Abnormal; Notable for the following:     Valproic Acid Lvl 37.6 (*)     All other components within normal limits   ACETAMINOPHEN LEVEL   ETHANOL   URINE DRUG SCREEN   URINALYSIS   SALICYLATE Kaylin Cook MD  11/04/17 9929

## 2017-11-04 NOTE — BH NOTE
Psychiatry Initial Intake    11/4/17  Patient does not meet criteria for admission to unit. Patient would not benefit from groups and cannot participate in groups due to hearing issue. Patient denies, SI, HI, did not answer many questions, but is being discharged to the care of the nursing home which provides a safe environment. Philip De Oliveira ,a 65 y.o. female, presents to the ED for a psychiatric assessment. PATIENT CANNOT HEAR  COMMUNICATION VIA ERASE BOARD  Patient very sleepy, does not want to wake up, read and answer questions. ED Admit time: 11:44  ED physician: [facility] Notification time: 13:21  LIZETET Assess time: 13:25  Psychiatrist call time:  Zulma Page     Patient is referred by:  EMS from nursing home for hallucinations    Reason for visit to ED - Presenting problem:   Patient is sleeping - soundly, will not stay awake to answer any questions written on board - which is only means of communication because patient cannot hear. Continual stimulation to try to stay awake, but continues to go to sleep. Did reply to Are you wanting to kill yourself \"NO\". Have you tried in the past \"yes\" how/when - no response. Are you seeing things\" NO\". PER ED Physician - information from Corby Elizabeth is a 65 y.o. female who presents to the emergency department For worsening aggressive behavior and hallucinations. Patient is nursing home resident. She has a history of mental health problems in the past. Nursing home states over the past 2 months she's had increasing problems with aggression and hallucinations. Patient is very hard of hearing. She really cannot understand anything I say. She had a cochlear implant that was lost a few months ago and since then really has not been able to hear. I'm sure this has quite a bit to do with why she is increasingly angry and agitated.  Nursing home states that patient has been banging side of her head against things trying to get the implant to work and has indicated to them that she thinks this is a phone that people are communicating to her through. She has told them that she thinks that her nephew drowned under a bridge. She is not voicing this to me. Patient unable to understand anything I say  so I was only able to communicate with her by writing things down on a dry erase board which she was able to read without any problems. She is alert and oriented to person place and time. She has no complaints of any kind. She denies wanting to hurt herself or anyone else although she said earlier she was a bit agitated at the nursing home and wanted to hurt the caregivers there but said she no longer wants to do this. They said that she threatened to break a window while she was there. She has no complaints at all except for some lower back discomfort but she said this is chronic and unchanged from baseline.  She does have a history of suicide attempts in the past but she is denying suicidal ideation to me at this time.        Duration of symptoms:  unknow    Current Stressors:   SI:      Denies  Plan:   Past SI attempts:  Yes, but could not reply/understand  Dates or Ages:   Currently able to contract for safety outside hospital:       C-SSRS Completed:   Could not answer these questions:  HI:   Delusions:    Hallucinations:   Risk of Harm to self:    Was it within the past 6 months:   Risk of Harm to others:   Was it within the past 6 months:   Anxiety 1-10:   Explain if increased:   Depression 1-10:    Explain if increased:   Risk taking behaviors:  Level of function outside hospital decreased:   History of Psychiatric Treatment:   Previous Outpatient therapy:  Where & Dates of Outpatient treatment:   Are you compliant with appointments:   Psychiatric Hospitalizations: Where & When:    Previous Diagnosis:  { Previous psychiatric medications:   Are you compliant with medications:   Violence and Trauma History:   History of violence by patient:  History of Trauma: History of Abuse:  Family History:  Family history of mental illness:    Family member & Diagnosis:   \"   Family members with suicide attempt:     completed suicide:        Substance Abuse History:     SBIRT Completed:     Current ETOH LEVELS:     ETOH Abuse:   Age of first drink:   Date of last drink:   Amount drinking daily:   Years of use:   Longest period of sobriety:   ETOH treatment history:   History of seizures, blackouts, etc. due to ETOH abuse:  Family history of ETOH abuse:    Legal consequences of chemical use:    Substance/Chemical Abuse/Recreational Drug History:   Age of first substance use:   Substance used: unable to assess  Date of last substance use:    Substance used:   Amount of substance use daily:   Years of use:   Longest period of sobriety:  Substance treatment history:  Family history of substance abuse:   Legal consequences of chemical use: Tobacco use  Packs  Age  Opiates: It was discussed with pt they would not be receiving opiates unless they were within 5 days post surgery/injury. Patient voiced understanding and agreed. Psychiatric Review Of Systems:     Recent Sleep changes:   Average Hours per night  With sleep aid:   Restful sleep:   Difficulty falling asleep:    Difficulty staying asleep:  Difficulty awakening:      Recent appetite changes:    Recent weight changes:    Pounds gained (+) or lost (-) :     Energy level changes:   Interest/pleasure/anhedonia:       Medical History:     Medical Diagnosis/Issues:   Use of 02 or CPAP:   Ambulatory:   Independent Self Care:   Use of OTC:   Somatic symptoms:    PCP: Dr. Audelia Hutson M.D., MD     Current Medications:   Scheduled Meds: No current facility-administered medications for this encounter.      Current Outpatient Prescriptions:     traMADol (ULTRAM) 50 MG tablet, Take 1 tablet by mouth every 6 hours as needed for Pain, Disp: 20 tablet, Rfl: 0    clonazePAM (KLONOPIN) 1 MG tablet, Take 1 tablet by mouth 3 times daily, Disp: 60 tablet, Rfl: 0    gabapentin (NEURONTIN) 300 MG capsule, Take 2 capsules by mouth 3 times daily, Disp: 60 capsule, Rfl: 0    loperamide (IMODIUM) 2 MG capsule, Take 1 capsule by mouth 4 times daily as needed for Diarrhea, Disp: 20 capsule, Rfl: 0    levothyroxine (SYNTHROID) 100 MCG tablet, Take 1 tablet by mouth Daily, Disp: 30 tablet, Rfl: 3    alendronate (FOSAMAX) 70 MG tablet, Take 70 mg by mouth every 7 days mondays, Disp: , Rfl:     fenofibrate (TRICOR) 145 MG tablet, Take 145 mg by mouth daily, Disp: , Rfl:     acetaminophen (TYLENOL) 325 MG tablet, Take 2 tablets by mouth every 4 hours as needed for Pain, Disp: 120 tablet, Rfl: 3    ibuprofen (ADVIL;MOTRIN) 400 MG tablet, Take 1 tablet by mouth every 6 hours as needed for Pain, Disp: 120 tablet, Rfl: 3    divalproex (DEPAKOTE) 500 MG DR tablet, Take 1 tablet by mouth nightly (Patient taking differently: Take 500 mg by mouth 2 times daily ), Disp: 90 tablet, Rfl: 0    traZODone (DESYREL) 50 MG tablet, Take 1 tablet by mouth nightly as needed for Sleep, Disp: 30 tablet, Rfl: 1    fluticasone (FLONASE) 50 MCG/ACT nasal spray, 2 sprays by Nasal route daily , Disp: , Rfl:     Cholecalciferol (VITAMIN D3) 5000 UNITS TABS, Take by mouth, Disp: , Rfl:     cyclobenzaprine (FLEXERIL) 10 MG tablet, Take 10 mg by mouth daily At 2000, Disp: , Rfl:     atorvastatin (LIPITOR) 40 MG tablet, TAKE ONE TABLET BY MOUTH EVERY DAY, Disp: 30 tablet, Rfl: 5    folic acid (FOLVITE) 1 MG tablet, TAKE ONE TABLET BY MOUTH EVERY DAY, Disp: 30 tablet, Rfl: 5    duloxetine (CYMBALTA) 60 MG capsule, Take 90 mg by mouth daily , Disp: , Rfl:        Mental Status Evaluation:     Appearance:  older than stated age   Behavior:  Psychomotor Retardation   Speech:  delayed   Mood:  Unable to assess   Affect:  normal   Thought Process:  Unable to assess   Thought Content:  Unable to assess   Sensorium:  Unable to assess   Cognition:  Unable to assess   Insight:  Unable to assess   Judgment:  Unable to assess     Social Information:    Education:    Collateral Information:    Relationship:   Name:   Phone Number:   Collateral:       Disposition:     Choose one of the four options below for   disposition:      3.  Decision to Discharge:   Does not meet criteria for acceptance to   unit due to:  Read above       Nilsa Willard RN

## 2017-11-04 NOTE — ED NOTES
Bed: 17  Expected date:   Expected time:   Means of arrival:   Comments:  Ems gerry Eric RN  11/04/17 1145

## 2017-11-04 NOTE — ED TRIAGE NOTES
Sent by nursing home staff for hallucinations. EMS states the patient was talking to things and people that were not there. Patient denies SI or HI at this time. Patient is hard of hearing.

## 2017-11-04 NOTE — ED NOTES
Spoke to nursing home nurse per MD request. They states her anger has been progressively worsening for the last 1-2 months. In the last week it has been worse than ever. Portion of her cochlear implant was lost, patient thinks it is her phone. They also states she has been talking to herself and seeing things that aren't there, talking about her nephews dying under the [place] bridge. She threatened to bust a window today out of anger. She has a history of suicide attempt. Sleeps constantly and is depressed. Director of nursing and nurse decided to sent the patient after her anger outburst this morning. Spoke to Dylan quesada.       Ada Yoo RN  11/04/17 [id number]

## 2017-11-04 NOTE — ED NOTES
Patient changed into purple paper scrubs. Urine sent to lab. Room prepared per facility policy. 1:1 sitter initiated. Patient's belongings sent with security.       Ada Yoo RN  11/04/17 737 Demetrio Calvo RN  11/04/17 [id number]

## 2017-11-10 ENCOUNTER — HOSPITAL ENCOUNTER (INPATIENT)
Facility: HOSPITAL | Age: 62
LOS: 10 days | Discharge: NURSING, REHAB OR LONG-TERM CARE | End: 2017-11-20
Attending: PSYCHIATRY & NEUROLOGY | Admitting: PSYCHIATRY & NEUROLOGY

## 2017-11-10 DIAGNOSIS — Z74.09 IMPAIRED PHYSICAL MOBILITY: ICD-10-CM

## 2017-11-10 PROBLEM — F29 PSYCHOSIS (HCC): Status: ACTIVE | Noted: 2017-11-10

## 2017-11-10 RX ORDER — ACETAMINOPHEN 325 MG/1
650 TABLET ORAL EVERY 4 HOURS PRN
Status: DISCONTINUED | OUTPATIENT
Start: 2017-11-10 | End: 2017-11-20 | Stop reason: HOSPADM

## 2017-11-10 RX ORDER — TRAZODONE HYDROCHLORIDE 50 MG/1
50 TABLET ORAL NIGHTLY PRN
Status: DISCONTINUED | OUTPATIENT
Start: 2017-11-10 | End: 2017-11-20 | Stop reason: HOSPADM

## 2017-11-10 RX ORDER — ALUMINA, MAGNESIA, AND SIMETHICONE 2400; 2400; 240 MG/30ML; MG/30ML; MG/30ML
15 SUSPENSION ORAL EVERY 6 HOURS PRN
Status: DISCONTINUED | OUTPATIENT
Start: 2017-11-10 | End: 2017-11-20 | Stop reason: HOSPADM

## 2017-11-10 RX ORDER — LOPERAMIDE HYDROCHLORIDE 2 MG/1
2 CAPSULE ORAL 4 TIMES DAILY PRN
Status: DISCONTINUED | OUTPATIENT
Start: 2017-11-10 | End: 2017-11-20 | Stop reason: HOSPADM

## 2017-11-10 RX ORDER — HYDROXYZINE PAMOATE 50 MG/1
50 CAPSULE ORAL EVERY 6 HOURS PRN
Status: DISCONTINUED | OUTPATIENT
Start: 2017-11-10 | End: 2017-11-20 | Stop reason: HOSPADM

## 2017-11-10 RX ORDER — NICOTINE 21 MG/24HR
1 PATCH, TRANSDERMAL 24 HOURS TRANSDERMAL EVERY 24 HOURS
Status: DISCONTINUED | OUTPATIENT
Start: 2017-11-11 | End: 2017-11-20 | Stop reason: HOSPADM

## 2017-11-11 PROCEDURE — 94799 UNLISTED PULMONARY SVC/PX: CPT

## 2017-11-11 PROCEDURE — 94760 N-INVAS EAR/PLS OXIMETRY 1: CPT

## 2017-11-11 PROCEDURE — 99221 1ST HOSP IP/OBS SF/LOW 40: CPT | Performed by: FAMILY MEDICINE

## 2017-11-11 PROCEDURE — 94640 AIRWAY INHALATION TREATMENT: CPT

## 2017-11-11 RX ORDER — DULOXETIN HYDROCHLORIDE 30 MG/1
30 CAPSULE, DELAYED RELEASE ORAL DAILY
COMMUNITY
End: 2017-11-20 | Stop reason: HOSPADM

## 2017-11-11 RX ORDER — ALENDRONATE SODIUM 70 MG/1
70 TABLET ORAL
COMMUNITY

## 2017-11-11 RX ORDER — LEVOTHYROXINE SODIUM 0.1 MG/1
100 TABLET ORAL
Status: DISCONTINUED | OUTPATIENT
Start: 2017-11-11 | End: 2017-11-15

## 2017-11-11 RX ORDER — ALBUTEROL SULFATE 90 UG/1
2 AEROSOL, METERED RESPIRATORY (INHALATION) 4 TIMES DAILY
COMMUNITY

## 2017-11-11 RX ORDER — CLONAZEPAM 0.5 MG/1
0.5 TABLET ORAL EVERY 8 HOURS SCHEDULED
Status: DISCONTINUED | OUTPATIENT
Start: 2017-11-11 | End: 2017-11-13

## 2017-11-11 RX ORDER — SERTRALINE HYDROCHLORIDE 100 MG/1
100 TABLET, FILM COATED ORAL DAILY
COMMUNITY

## 2017-11-11 RX ORDER — IBUPROFEN 400 MG/1
400 TABLET ORAL 2 TIMES DAILY
COMMUNITY

## 2017-11-11 RX ORDER — FENOFIBRATE 145 MG/1
145 TABLET, COATED ORAL DAILY
Status: DISCONTINUED | OUTPATIENT
Start: 2017-11-11 | End: 2017-11-20 | Stop reason: HOSPADM

## 2017-11-11 RX ORDER — DULOXETIN HYDROCHLORIDE 60 MG/1
60 CAPSULE, DELAYED RELEASE ORAL DAILY
Status: DISCONTINUED | OUTPATIENT
Start: 2017-11-11 | End: 2017-11-19

## 2017-11-11 RX ORDER — GABAPENTIN 600 MG/1
600 TABLET ORAL 3 TIMES DAILY
COMMUNITY

## 2017-11-11 RX ORDER — ATORVASTATIN CALCIUM 40 MG/1
40 TABLET, FILM COATED ORAL NIGHTLY
COMMUNITY

## 2017-11-11 RX ORDER — LIDOCAINE 50 MG/G
1 PATCH TOPICAL
Status: DISCONTINUED | OUTPATIENT
Start: 2017-11-11 | End: 2017-11-20 | Stop reason: HOSPADM

## 2017-11-11 RX ORDER — DIVALPROEX SODIUM 500 MG/1
500 TABLET, DELAYED RELEASE ORAL NIGHTLY
COMMUNITY
End: 2017-11-20 | Stop reason: HOSPADM

## 2017-11-11 RX ORDER — FENOFIBRATE 145 MG/1
145 TABLET, COATED ORAL DAILY
COMMUNITY

## 2017-11-11 RX ORDER — ALBUTEROL SULFATE 2.5 MG/3ML
2.5 SOLUTION RESPIRATORY (INHALATION)
Status: DISCONTINUED | OUTPATIENT
Start: 2017-11-11 | End: 2017-11-11 | Stop reason: SDUPTHER

## 2017-11-11 RX ORDER — ATORVASTATIN CALCIUM 40 MG/1
40 TABLET, FILM COATED ORAL NIGHTLY
Status: DISCONTINUED | OUTPATIENT
Start: 2017-11-11 | End: 2017-11-20 | Stop reason: HOSPADM

## 2017-11-11 RX ORDER — LEVOTHYROXINE SODIUM 0.1 MG/1
100 TABLET ORAL DAILY
Status: ON HOLD | COMMUNITY
End: 2017-11-20

## 2017-11-11 RX ORDER — CYCLOBENZAPRINE HCL 10 MG
10 TABLET ORAL DAILY
Status: DISCONTINUED | OUTPATIENT
Start: 2017-11-11 | End: 2017-11-20 | Stop reason: HOSPADM

## 2017-11-11 RX ORDER — FOLIC ACID 1 MG/1
1 TABLET ORAL DAILY
COMMUNITY

## 2017-11-11 RX ORDER — TRAMADOL HYDROCHLORIDE 50 MG/1
50 TABLET ORAL EVERY 6 HOURS PRN
Status: DISCONTINUED | OUTPATIENT
Start: 2017-11-11 | End: 2017-11-20 | Stop reason: HOSPADM

## 2017-11-11 RX ORDER — CLONAZEPAM 0.5 MG/1
0.5 TABLET ORAL 2 TIMES DAILY
COMMUNITY
End: 2017-11-20 | Stop reason: HOSPADM

## 2017-11-11 RX ORDER — ALBUTEROL SULFATE 90 UG/1
2 AEROSOL, METERED RESPIRATORY (INHALATION)
Status: DISCONTINUED | OUTPATIENT
Start: 2017-11-11 | End: 2017-11-11 | Stop reason: CLARIF

## 2017-11-11 RX ORDER — CLONAZEPAM 1 MG/1
1 TABLET ORAL NIGHTLY
COMMUNITY
End: 2017-11-20 | Stop reason: HOSPADM

## 2017-11-11 RX ORDER — DIVALPROEX SODIUM 250 MG/1
500 TABLET, DELAYED RELEASE ORAL NIGHTLY
Status: DISCONTINUED | OUTPATIENT
Start: 2017-11-11 | End: 2017-11-15

## 2017-11-11 RX ORDER — GABAPENTIN 300 MG/1
600 CAPSULE ORAL EVERY 8 HOURS SCHEDULED
Status: DISCONTINUED | OUTPATIENT
Start: 2017-11-11 | End: 2017-11-20 | Stop reason: HOSPADM

## 2017-11-11 RX ORDER — DULOXETIN HYDROCHLORIDE 60 MG/1
60 CAPSULE, DELAYED RELEASE ORAL DAILY
COMMUNITY
End: 2017-11-20 | Stop reason: HOSPADM

## 2017-11-11 RX ORDER — CYCLOBENZAPRINE HCL 10 MG
10 TABLET ORAL NIGHTLY
COMMUNITY

## 2017-11-11 RX ORDER — FLUTICASONE PROPIONATE 50 MCG
2 SPRAY, SUSPENSION (ML) NASAL DAILY
Status: DISCONTINUED | OUTPATIENT
Start: 2017-11-11 | End: 2017-11-20 | Stop reason: HOSPADM

## 2017-11-11 RX ORDER — FLUTICASONE PROPIONATE 50 MCG
2 SPRAY, SUSPENSION (ML) NASAL DAILY
COMMUNITY

## 2017-11-11 RX ORDER — DIVALPROEX SODIUM 250 MG/1
250 TABLET, DELAYED RELEASE ORAL EVERY MORNING
COMMUNITY
End: 2017-11-20 | Stop reason: HOSPADM

## 2017-11-11 RX ORDER — DIVALPROEX SODIUM 250 MG/1
250 TABLET, DELAYED RELEASE ORAL DAILY
Status: DISCONTINUED | OUTPATIENT
Start: 2017-11-11 | End: 2017-11-15

## 2017-11-11 RX ORDER — IPRATROPIUM BROMIDE AND ALBUTEROL SULFATE 2.5; .5 MG/3ML; MG/3ML
3 SOLUTION RESPIRATORY (INHALATION)
Status: DISCONTINUED | OUTPATIENT
Start: 2017-11-11 | End: 2017-11-20 | Stop reason: HOSPADM

## 2017-11-11 RX ADMIN — HYDROXYZINE PAMOATE 50 MG: 50 CAPSULE ORAL at 01:46

## 2017-11-11 RX ADMIN — ACETAMINOPHEN 650 MG: 325 TABLET ORAL at 14:16

## 2017-11-11 RX ADMIN — FENOFIBRATE 145 MG: 145 TABLET ORAL at 11:31

## 2017-11-11 RX ADMIN — ALUMINUM HYDROXIDE, MAGNESIUM HYDROXIDE, AND DIMETHICONE 15 ML: 400; 400; 40 SUSPENSION ORAL at 01:47

## 2017-11-11 RX ADMIN — NICOTINE 1 PATCH: 14 PATCH, EXTENDED RELEASE TRANSDERMAL at 11:42

## 2017-11-11 RX ADMIN — CLONAZEPAM 0.5 MG: 0.5 TABLET ORAL at 20:34

## 2017-11-11 RX ADMIN — LEVOTHYROXINE SODIUM 100 MCG: 100 TABLET ORAL at 11:31

## 2017-11-11 RX ADMIN — IPRATROPIUM BROMIDE AND ALBUTEROL SULFATE 3 ML: 2.5; .5 SOLUTION RESPIRATORY (INHALATION) at 16:21

## 2017-11-11 RX ADMIN — DULOXETINE 60 MG: 60 CAPSULE, DELAYED RELEASE ORAL at 11:31

## 2017-11-11 RX ADMIN — TRAZODONE HYDROCHLORIDE 50 MG: 50 TABLET ORAL at 20:35

## 2017-11-11 RX ADMIN — SERTRALINE HYDROCHLORIDE 100 MG: 50 TABLET ORAL at 11:34

## 2017-11-11 RX ADMIN — CYCLOBENZAPRINE HYDROCHLORIDE 10 MG: 10 TABLET, FILM COATED ORAL at 11:16

## 2017-11-11 RX ADMIN — ACETAMINOPHEN 650 MG: 325 TABLET ORAL at 01:46

## 2017-11-11 RX ADMIN — ACETAMINOPHEN 650 MG: 325 TABLET ORAL at 20:35

## 2017-11-11 RX ADMIN — LIDOCAINE 1 PATCH: 50 PATCH CUTANEOUS at 12:00

## 2017-11-11 RX ADMIN — IPRATROPIUM BROMIDE AND ALBUTEROL SULFATE 3 ML: 2.5; .5 SOLUTION RESPIRATORY (INHALATION) at 12:15

## 2017-11-11 RX ADMIN — DIVALPROEX SODIUM 250 MG: 250 TABLET, DELAYED RELEASE ORAL at 11:32

## 2017-11-11 RX ADMIN — GABAPENTIN 600 MG: 300 CAPSULE ORAL at 14:09

## 2017-11-11 RX ADMIN — IPRATROPIUM BROMIDE AND ALBUTEROL SULFATE 3 ML: 2.5; .5 SOLUTION RESPIRATORY (INHALATION) at 19:44

## 2017-11-11 RX ADMIN — ACETAMINOPHEN 650 MG: 325 TABLET ORAL at 11:16

## 2017-11-11 RX ADMIN — CLONAZEPAM 0.5 MG: 0.5 TABLET ORAL at 14:08

## 2017-11-11 RX ADMIN — DIVALPROEX SODIUM 500 MG: 250 TABLET, DELAYED RELEASE ORAL at 20:42

## 2017-11-11 RX ADMIN — FLUTICASONE PROPIONATE 2 SPRAY: 50 SPRAY, METERED NASAL at 11:33

## 2017-11-11 RX ADMIN — HYDROXYZINE PAMOATE 50 MG: 50 CAPSULE ORAL at 14:08

## 2017-11-11 RX ADMIN — ATORVASTATIN CALCIUM 40 MG: 40 TABLET, FILM COATED ORAL at 20:42

## 2017-11-11 RX ADMIN — GABAPENTIN 600 MG: 300 CAPSULE ORAL at 20:35

## 2017-11-11 NOTE — H&P
"11/11/2017    Source of History:  chart review and the patient by writing questions    Chief Complaint: psychosis per report with behavioral issues, pt States ahe does not know.    History of Present Illness:    Patient is a 62 y.o. female who presents with physical aggression, psychosis and delusions. Onset of symptoms was abrupt starting a few days ago.  Symptoms have been present on a intemittent basis. Symptoms are associated with agitation, irritability and chronic pain.  Symptoms are aggravated by problems with health.   Symptoms improve with \" I dont Know\" was patient's response..  Patients symptoms severity is moderate.   Patient reports that level of hopefulness is 8/10.  Patient's symptoms occur in the context of patient's admission from NH.  Patient has been yelling,screaming,hallucinating per their report.  She wears chchlear device which she has left at the nursing home.  All communication took place by writing and is very limited      Psychiatric Review Of Systems:  back and leg pain.  Denies all psychiatric problems.  No agitation noted by the staff.    History  Past psychiatric history:    Psychiatric Hospitalizations: Patient's hospitalizations have been for psychotic episodes.    Suicide Attempts: Patient has had no prior suicide attempts.    Prior Treatment and Medications Tried: unknown    History of violence or legal issues: The patient has no significant history of legal issues.    Social History:    Social History     Social History   • Marital status: Single     Spouse name: N/A   • Number of children: N/A   • Years of education: N/A     Occupational History   • Not on file.     Social History Main Topics   • Smoking status: Former Smoker   • Smokeless tobacco: Not on file   • Alcohol use Not on file   • Drug use: Not on file   • Sexual activity: Not on file     Other Topics Concern   • Not on file     Social History Narrative   • No narrative on file       Abuse/Trauma: History of physical " abuse: no      Family History:    History reviewed. No pertinent family history.    Further details: Non contributory    Past Medical and Surgical History:    Past Medical History:   Diagnosis Date   • Anxiety    • Chronic pain disorder    • Depression    • Disease of thyroid gland    • Psychosis      History reviewed. No pertinent surgical history.  Allergies:  Lyrica [pregabalin] and Savella [milnacipran hcl]  Prescriptions Prior to Admission   Medication Sig Dispense Refill Last Dose   • albuterol (PROVENTIL HFA;VENTOLIN HFA) 108 (90 Base) MCG/ACT inhaler Inhale 2 puffs 4 (Four) Times a Day.   11/10/2017 at Unknown time   • alendronate (FOSAMAX) 70 MG tablet Take 70 mg by mouth Every 7 (Seven) Days.   Past Week at Unknown time   • atorvastatin (LIPITOR) 40 MG tablet Take 40 mg by mouth Every Night.   11/10/2017 at Unknown time   • clonazePAM (KlonoPIN) 0.5 MG tablet Take 0.5 mg by mouth 2 (Two) Times a Day. Give at 0700 and 1200 noon.   11/10/2017 at Unknown time   • clonazePAM (KlonoPIN) 1 MG tablet Take 1 mg by mouth Every Night.   11/10/2017 at Unknown time   • cyclobenzaprine (FLEXERIL) 10 MG tablet Take 10 mg by mouth Every Night. Give at 2000   11/10/2017 at Unknown time   • divalproex (DEPAKOTE) 250 MG DR tablet Take 250 mg by mouth Every Morning.   11/10/2017 at Unknown time   • divalproex (DEPAKOTE) 500 MG DR tablet Take 500 mg by mouth Every Night.   11/10/2017 at Unknown time   • DULoxetine (CYMBALTA) 30 MG capsule Take 30 mg by mouth Daily. Give with 60mg to total 90mg.   11/10/2017 at Unknown time   • DULoxetine (CYMBALTA) 60 MG capsule Take 60 mg by mouth Daily. Give with 30mg to total 90mg   11/10/2017 at Unknown time   • fenofibrate (TRICOR) 145 MG tablet Take 145 mg by mouth Daily.   11/10/2017 at Unknown time   • fluticasone (FLONASE) 50 MCG/ACT nasal spray 2 sprays into each nostril Daily.   11/10/2017 at Unknown time   • folic acid (FOLVITE) 1 MG tablet Take 1 mg by mouth Daily.   11/10/2017  "at Unknown time   • gabapentin (NEURONTIN) 600 MG tablet Take 600 mg by mouth 3 (Three) Times a Day.   11/10/2017 at Unknown time   • ibuprofen (ADVIL,MOTRIN) 400 MG tablet Take 400 mg by mouth 2 (Two) Times a Day.   11/10/2017 at Unknown time   • levothyroxine (SYNTHROID, LEVOTHROID) 100 MCG tablet Take 100 mcg by mouth Daily.   11/10/2017 at Unknown time   • sertraline (ZOLOFT) 100 MG tablet Take 100 mg by mouth Daily.   11/10/2017 at Unknown time       Medical Review Of Systems: ER notes    Objective     Vital Signs    Temp:  [96.5 °F (35.8 °C)-98.2 °F (36.8 °C)] 98.2 °F (36.8 °C)  Heart Rate:  [62-65] 62  Resp:  [14-18] 18  BP: ()/(54-56) 102/56    Physical Exam:   General Appearance: alert, appears stated age and cooperative,  Hygiene:   fair  Gait & Station: Walker  Musculoskeletal: No tremors or abnormal involuntary movements    Mental Status Exam:   Cooperation:  Guarded  Eye Contact:  Good  Psychomotor Behavior:  Slow  Mood: \"Fine\"  Affect:  mood-congruent  Speech:  Dysarthria  Thought Process:  [place]  Associations: Goal Directed  Thought Content:     Normal   Suicidal:  None   Homicidal:  None   Hallucinations:  None   Delusion:  None  Cognitive Functioning:   Consciousness: awake and alert   Orientation:  Unable to evaluate   Attention: Unable to assess Concentration: unable to assess   Language:  Deficits Vocabulary: Unable to assess   Short Term Memory: unable to assess   Long Term Memory: Unable to assess   Fund of Knowledge: Average  Reliability:  fair  Insight:  Poor  Judgement:  Impaired  Impulse Control:  Fair    Diagnostic Data:    No results found for this or any previous visit (from the past 72 hour(s)).  No results found.      Patient Strengths: compliant with medication     Patient Barriers: impaired cognition, poor physical health, self-care deficit    Assessment/Plan     Active Problems:    Psychosis      Treatment Plan:    1) Will admit patient to the behavioral health unit at " [facility] to ensure patient safety.  2) Patient will be provided treatment with the unit milieu, activities, therapies and psychopharmacological management.  3) Patient placed on  Q15 minute checks  and Suicide, Aggression and Fall precautions.  4) Dr. Elmore consulted for management of medical co-morbidities.  5) Will order following labs: none  6) Will restart patient on the following psychiatric home meds: Will monitor sxs  7) Will make the following medication changes: None  8) Will begin discharge planning as appropriate for patient.  9) Psychotherapy provided: none    Treatment plan and medication risks and benefits discussed with: Patient      Estimated Length of Stay: 3-5 days  Prognosis: mohan Cooper MD  11/11/17  9:41 AM

## 2017-11-11 NOTE — PLAN OF CARE
Problem: BH Patient Care Overview (Adult)  Goal: Plan of Care Review  Outcome: Ongoing (interventions implemented as appropriate)  Goal: Individualization and Mutuality  Outcome: Ongoing (interventions implemented as appropriate)  Goal: Discharge Needs Assessment  Outcome: Ongoing (interventions implemented as appropriate)    Problem: BH Overarching Goals  Goal: Adheres to Safety Considerations for Self and Others  Outcome: Ongoing (interventions implemented as appropriate)  Goal: Optimized Coping Skills in Response to Life Stressors  Outcome: Ongoing (interventions implemented as appropriate)  Goal: Develops/Participates in Therapeutic [place] to Support Successful Transition  Outcome: Ongoing (interventions implemented as appropriate)

## 2017-11-11 NOTE — PLAN OF CARE
Problem: BH Overarching Goals  Goal: Adheres to Safety Considerations for Self and Others  Outcome: Ongoing (interventions implemented as appropriate)  Goal: Optimized Coping Skills in Response to Life Stressors  Outcome: Ongoing (interventions implemented as appropriate)  Goal: Develops/Participates in Therapeutic [place] to Support Successful Transition  Outcome: Ongoing (interventions implemented as appropriate)    Problem: Alteration in Thoughts and Perception  Goal: Verbalize thoughts and feelings associated with:  Outcome: Ongoing (interventions implemented as appropriate)  Goal: Refrain from acting on delusional thinking/internal stimuli  Outcome: Ongoing (interventions implemented as appropriate)  Goal: Agree to be compliant with medication regime, as prescribed and report medication side effects  Outcome: Ongoing (interventions implemented as appropriate)  Goal: Attend and participate in unit activities, including therapeutic, recreational, and educational groups  Outcome: Ongoing (interventions implemented as appropriate)  Goal: Complete daily ADLs, including personal hygiene independently, as able  Outcome: Ongoing (interventions implemented as appropriate)

## 2017-11-11 NOTE — NURSING NOTE
Dr Elmore ROS   Patient is from nursing home.  Has cochlear implant device but it is at nursing home, she is totally deaf.  Wears glasses.  C/o chronic back pain.  Is here because of behavior at NH, not sleeping, yelling, threatening, hearing voices, delusional.  Ambulates with walker.  Has COPD.      General  NONE    Eyes   glasses/contact lens    ENT/Mouth   Hearing Loss    Cardio   None    Resp   None    GI    None       None    MS    Muscle or Joint weakness    Skin/Hair/Nails   None    Neuro   None

## 2017-11-11 NOTE — NURSING NOTE
Patient arrived at 2130 via EMR.  She ambulates with walker.  She is deaf, has cochlear implant device but it is at nursing home.  She wears glasses.  She is pleasant, cooperative but I had to converse with her by writing down my questions and she would answer.  She talks loud because she can't hear self talk, does not know she is loud.  She is a/o x 2, knows date, place, but does not know why she is here.  Per nursing home patient has not been sleeping, has been delusional (thinks family are all dead), is responding to voices, is paranoid, has been threatening.  She signed admission papers.  Explained she would see  in am.  She ate snack.  Noted she does not have dentures, has few decaying teeth.  Denies problems eating.  Affect is approp. for situation.  Allergies incl. lyrica and savella.  Rec'd DNR papers from nursing home.

## 2017-11-11 NOTE — CONSULTS
CHIEF COMPLAINT/REASON FOR VISIT:  Visual Hallucinations and Agitation    HPI:  Patient presented to [facility] emergency room apparently in [place] with the above complaints.  The evaluation is complicated by the fact that the patient appears to have no hearing whatsoever and grunts frequently in response to what appear to be internal cues.  She reported visual hallucinations perhaps delusions and concerns about noises in her head that might be voices.  She is a usual nursing home resident and although we do not have any of those records the history is that she had become increasingly agitated there.  It appears that she has a cochlear implant and the device that attaches to this was left at the nursing home.    PROBLEM LIST:  Patient Active Problem List    Diagnosis   • Psychosis [F29]         CURRENT MEDICATIONS:  Prescriptions Prior to Admission   Medication Sig Dispense Refill Last Dose   • albuterol (PROVENTIL HFA;VENTOLIN HFA) 108 (90 Base) MCG/ACT inhaler Inhale 2 puffs 4 (Four) Times a Day.   11/10/2017 at Unknown time   • alendronate (FOSAMAX) 70 MG tablet Take 70 mg by mouth Every 7 (Seven) Days.   Past Week at Unknown time   • atorvastatin (LIPITOR) 40 MG tablet Take 40 mg by mouth Every Night.   11/10/2017 at Unknown time   • clonazePAM (KlonoPIN) 0.5 MG tablet Take 0.5 mg by mouth 2 (Two) Times a Day. Give at 0700 and 1200 noon.   11/10/2017 at Unknown time   • clonazePAM (KlonoPIN) 1 MG tablet Take 1 mg by mouth Every Night.   11/10/2017 at Unknown time   • cyclobenzaprine (FLEXERIL) 10 MG tablet Take 10 mg by mouth Every Night. Give at 2000   11/10/2017 at Unknown time   • divalproex (DEPAKOTE) 250 MG DR tablet Take 250 mg by mouth Every Morning.   11/10/2017 at Unknown time   • divalproex (DEPAKOTE) 500 MG DR tablet Take 500 mg by mouth Every Night.   11/10/2017 at Unknown time   • DULoxetine (CYMBALTA) 30 MG capsule Take 30 mg by mouth Daily. Give with 60mg to total 90mg.   11/10/2017 at  Unknown time   • DULoxetine (CYMBALTA) 60 MG capsule Take 60 mg by mouth Daily. Give with 30mg to total 90mg   11/10/2017 at Unknown time   • fenofibrate (TRICOR) 145 MG tablet Take 145 mg by mouth Daily.   11/10/2017 at Unknown time   • fluticasone (FLONASE) 50 MCG/ACT nasal spray 2 sprays into each nostril Daily.   11/10/2017 at Unknown time   • folic acid (FOLVITE) 1 MG tablet Take 1 mg by mouth Daily.   11/10/2017 at Unknown time   • gabapentin (NEURONTIN) 600 MG tablet Take 600 mg by mouth 3 (Three) Times a Day.   11/10/2017 at Unknown time   • ibuprofen (ADVIL,MOTRIN) 400 MG tablet Take 400 mg by mouth 2 (Two) Times a Day.   11/10/2017 at Unknown time   • levothyroxine (SYNTHROID, LEVOTHROID) 100 MCG tablet Take 100 mcg by mouth Daily.   11/10/2017 at Unknown time   • sertraline (ZOLOFT) 100 MG tablet Take 100 mg by mouth Daily.   11/10/2017 at Unknown time       ALLERGIES:  Lyrica [pregabalin] and Savella [milnacipran hcl]      PAST MEDICAL/SURGICAL HISTORY:  Past Medical History:   Diagnosis Date   • Anxiety    • Chronic pain disorder    • Depression    • Disease of thyroid gland    • Psychosis        History reviewed. No pertinent surgical history.    Review of Systems   Constitutional: Negative for activity change, appetite change, fatigue and fever.   HENT: Negative for congestion, ear discharge, ear pain, facial swelling, hearing loss, nosebleeds, postnasal drip, rhinorrhea, sinus pressure, sore throat, tinnitus and trouble swallowing.    Eyes: Negative for pain, discharge and visual disturbance.   Respiratory: Negative for cough, shortness of breath and wheezing.    Cardiovascular: Negative for chest pain, palpitations and leg swelling.   Gastrointestinal: Negative for abdominal pain, blood in stool, constipation, diarrhea, nausea and vomiting.   Genitourinary: Negative for difficulty urinating, dyspareunia, dysuria, flank pain, frequency, hematuria, menstrual problem, pelvic pain, urgency, vaginal  "bleeding and vaginal discharge.   Musculoskeletal: Positive for arthralgias. Negative for back pain, joint swelling, myalgias and neck pain.   Skin: Negative for rash and wound.   Neurological: Positive for weakness. Negative for dizziness, seizures, syncope, light-headedness and headaches.   Hematological: Negative for adenopathy.       Social History     Social History   • Marital status: Single     Spouse name: N/A   • Number of children: N/A   • Years of education: N/A     Occupational History   • Not on file.     Social History Main Topics   • Smoking status: Former Smoker   • Smokeless tobacco: Not on file   • Alcohol use Not on file   • Drug use: Not on file   • Sexual activity: Not on file     Other Topics Concern   • Not on file     Social History Narrative   • No narrative on file       History reviewed. No pertinent family history.          Objective     /56 (BP Location: Right arm, Patient Position: Lying)  Pulse 62  Temp 98.2 °F (36.8 °C) (Tympanic)   Resp 18  Ht 61\" (154.9 cm)  Wt 141 lb 11.2 oz (64.3 kg)  SpO2 96%  BMI 26.77 kg/m2    Physical Exam   Constitutional: She appears well-developed and well-nourished.   HENT:   Head: Normocephalic and atraumatic.   Eyes: Conjunctivae and EOM are normal.   Neck: Normal range of motion. Neck supple. No thyromegaly present.   Cardiovascular: Normal rate, regular rhythm and normal heart sounds.  Exam reveals no gallop and no friction rub.    No murmur heard.  Pulmonary/Chest: Effort normal and breath sounds normal. No respiratory distress. She has no wheezes. She has no rales.   Abdominal: Soft. She exhibits no distension and no mass. There is no tenderness. There is no rebound and no guarding.   Musculoskeletal: Normal range of motion.   Lymphadenopathy:     She has no cervical adenopathy.   Neurological: She is alert. She has normal strength and normal reflexes. She displays no tremor and normal reflexes. She exhibits normal muscle tone. She " displays no Babinski's sign on the right side. She displays no Babinski's sign on the left side.   Reflex Scores:       Tricep reflexes are 2+ on the right side and 2+ on the left side.       Bicep reflexes are 2+ on the right side and 2+ on the left side.       Brachioradialis reflexes are 2+ on the right side and 2+ on the left side.       Patellar reflexes are 2+ on the right side and 2+ on the left side.       Achilles reflexes are 2+ on the right side and 2+ on the left side.  Because the patient is unable to hear me and other communication is spotty, cannot test visual fields nor sensation on the face.  Other cranial nerves appear intact.  Precise coordination testing could not be done.   Skin: Skin is warm and dry. No rash noted. No erythema.   On the lateral aspect of the left ankle are 8 or 10 scaly raised plaques each about 1 x 1 cm with no surrounding unusual erythema or exudate.   Nursing note and vitals reviewed.      Dystonia/Tardive Dyskinesia  Absent  Meningeal Signs  Absent    Diagnostic Studies from [facility]:    CBC is unremarkable, ethanol less than 10, acetaminophen less than 15, salicylate less than 3.  Valproic acid level low at 37.6, CMP normal except slightly increased CO2 at 31 and alkaline phosphatase 167 and AST 39.  Urine drug screen is all negative, and urinalysis is unremarkable.    CT of the head without contrast shows no acute process.    Assessment/Plan     Patient Active Problem List    Diagnosis   • Psychosis [F29]     We don't have any records from the nursing home and no reliable problem list but surmising from her medicines the following are probable:    Hyperlipidemia    Osteoporosis or perhaps Paget's disease    Hypothyroidism    Some difficulty with swallowing recommended a mechanical soft diet    Patient had pneumonia and probable sepsis September 1 of this year and it appeared to have been resolved at discharge.      Continue Home Meds as ordered. Mental health and  pain issues managed per psychiatry.  Further diagnostic studies or intervention based on hospital course.

## 2017-11-12 PROCEDURE — 94760 N-INVAS EAR/PLS OXIMETRY 1: CPT

## 2017-11-12 PROCEDURE — 94799 UNLISTED PULMONARY SVC/PX: CPT

## 2017-11-12 RX ORDER — TRIAMCINOLONE ACETONIDE 1 MG/G
CREAM TOPICAL EVERY 12 HOURS SCHEDULED
Status: DISCONTINUED | OUTPATIENT
Start: 2017-11-12 | End: 2017-11-20 | Stop reason: HOSPADM

## 2017-11-12 RX ADMIN — ATORVASTATIN CALCIUM 40 MG: 40 TABLET, FILM COATED ORAL at 20:58

## 2017-11-12 RX ADMIN — CLONAZEPAM 0.5 MG: 0.5 TABLET ORAL at 14:38

## 2017-11-12 RX ADMIN — IPRATROPIUM BROMIDE AND ALBUTEROL SULFATE 3 ML: 2.5; .5 SOLUTION RESPIRATORY (INHALATION) at 19:45

## 2017-11-12 RX ADMIN — TRAMADOL HYDROCHLORIDE 50 MG: 50 TABLET, FILM COATED ORAL at 08:49

## 2017-11-12 RX ADMIN — DIVALPROEX SODIUM 500 MG: 250 TABLET, DELAYED RELEASE ORAL at 20:59

## 2017-11-12 RX ADMIN — FLUTICASONE PROPIONATE 2 SPRAY: 50 SPRAY, METERED NASAL at 08:50

## 2017-11-12 RX ADMIN — CYCLOBENZAPRINE HYDROCHLORIDE 10 MG: 10 TABLET, FILM COATED ORAL at 08:49

## 2017-11-12 RX ADMIN — ACETAMINOPHEN 650 MG: 325 TABLET ORAL at 20:23

## 2017-11-12 RX ADMIN — HYDROXYZINE PAMOATE 50 MG: 50 CAPSULE ORAL at 20:58

## 2017-11-12 RX ADMIN — TRIAMCINOLONE ACETONIDE: 1 CREAM TOPICAL at 21:04

## 2017-11-12 RX ADMIN — LIDOCAINE 1 PATCH: 50 PATCH CUTANEOUS at 08:51

## 2017-11-12 RX ADMIN — IPRATROPIUM BROMIDE AND ALBUTEROL SULFATE 3 ML: 2.5; .5 SOLUTION RESPIRATORY (INHALATION) at 09:00

## 2017-11-12 RX ADMIN — CLONAZEPAM 0.5 MG: 0.5 TABLET ORAL at 20:58

## 2017-11-12 RX ADMIN — LEVOTHYROXINE SODIUM 100 MCG: 100 TABLET ORAL at 06:07

## 2017-11-12 RX ADMIN — TRIAMCINOLONE ACETONIDE: 1 CREAM TOPICAL at 11:38

## 2017-11-12 RX ADMIN — DIVALPROEX SODIUM 250 MG: 250 TABLET, DELAYED RELEASE ORAL at 08:50

## 2017-11-12 RX ADMIN — HYDROXYZINE PAMOATE 50 MG: 50 CAPSULE ORAL at 08:49

## 2017-11-12 RX ADMIN — TRAMADOL HYDROCHLORIDE 50 MG: 50 TABLET, FILM COATED ORAL at 01:16

## 2017-11-12 RX ADMIN — SERTRALINE HYDROCHLORIDE 100 MG: 50 TABLET ORAL at 08:49

## 2017-11-12 RX ADMIN — FENOFIBRATE 145 MG: 145 TABLET ORAL at 08:49

## 2017-11-12 RX ADMIN — CLONAZEPAM 0.5 MG: 0.5 TABLET ORAL at 21:31

## 2017-11-12 RX ADMIN — GABAPENTIN 600 MG: 300 CAPSULE ORAL at 20:58

## 2017-11-12 RX ADMIN — CLONAZEPAM 0.5 MG: 0.5 TABLET ORAL at 06:07

## 2017-11-12 RX ADMIN — GABAPENTIN 600 MG: 300 CAPSULE ORAL at 14:27

## 2017-11-12 RX ADMIN — GABAPENTIN 600 MG: 300 CAPSULE ORAL at 21:31

## 2017-11-12 RX ADMIN — NICOTINE 1 PATCH: 14 PATCH, EXTENDED RELEASE TRANSDERMAL at 08:52

## 2017-11-12 RX ADMIN — HYDROXYZINE PAMOATE 50 MG: 50 CAPSULE ORAL at 01:16

## 2017-11-12 RX ADMIN — DULOXETINE 60 MG: 60 CAPSULE, DELAYED RELEASE ORAL at 08:49

## 2017-11-12 RX ADMIN — GABAPENTIN 600 MG: 300 CAPSULE ORAL at 06:07

## 2017-11-12 RX ADMIN — TRAZODONE HYDROCHLORIDE 50 MG: 50 TABLET ORAL at 21:00

## 2017-11-12 RX ADMIN — TRAMADOL HYDROCHLORIDE 50 MG: 50 TABLET, FILM COATED ORAL at 20:26

## 2017-11-12 NOTE — NURSING NOTE
Patient has not slept a wink all night.  Continues to yell out, makes weird noises.  Have tried to redirect her without success.  She has had trazodone, vistaril, tramadol but she remains awake and c/o pain.  She thought male RN was Saul.  She is taking fluids well.  Gait is slow but steady and noted she uses hand rails for support.

## 2017-11-12 NOTE — PLAN OF CARE
Problem: BH Patient Care Overview (Adult)  Goal: Plan of Care Review  Outcome: Ongoing (interventions implemented as appropriate)  Goal: Individualization and Mutuality  Outcome: Ongoing (interventions implemented as appropriate)  Goal: Discharge Needs Assessment  Outcome: Ongoing (interventions implemented as appropriate)    Problem: BH Overarching Goals  Goal: Adheres to Safety Considerations for Self and Others  Outcome: Ongoing (interventions implemented as appropriate)  Goal: Optimized Coping Skills in Response to Life Stressors  Outcome: Ongoing (interventions implemented as appropriate)  Goal: Develops/Participates in Therapeutic [place] to Support Successful Transition  Outcome: Ongoing (interventions implemented as appropriate)

## 2017-11-12 NOTE — NURSING NOTE
"Behavior   Anxiety 0  Depression 0  Pain 5  AVH no  S/I no  H/I no  Affect flat  Patient is limited as to how much she can interact with peers/staff.  Staff has to write everything down so patient can read it and then answer.  She cont. to be loud.  She told me her niece was coming \"to take me home.\"  Explained this not true.  Then she states \"Saul is here to see me.\"  Explained Saul not here.  She went about day area calling out to Saul.  She attempted to get out of unit and onto adult unit, banging on door and yelling.  She sat in day area, was constantly making loud noises.  Noted peers try to avoid patient.  She is more confused today.  Is a/o x 2.      Intervention  Offered snack, hs meds.  Attempted to interview patient.  She is able to answer after reading question.    Instructed in medication usage and effects  Medications administered as ordered  Encouraged to verbalize needs      Response  Patient can follow simple instructions.  She ate 1/2 sandwich for snack.  Took hs meds incl. Trazodone for sleep.    Verbalized understanding   Did patient take medications as ordered yes   Did patient interact with assessment?  yes     Plan  Will monitor for safety  Will monitor every 15 minutes as ordered  Will evaluate and promote the plan of care  "

## 2017-11-12 NOTE — PROGRESS NOTES
11/12/2017    Chief Complaint:aggression    Subjective:  Patient is a 62 y.o. female who was hospitalized for physical aggression and paranoia.   Since yesterday the patient has been demonstrating behavioral issues.  Patient reports that level of hopefulness is 5/10.  Patient reports medications are tolerable.  Further history reported: She continues to comlain of physical sxs.  She appears more aggressive today than yesterday.  She is hard to understand.  All information is obtained by writing.  She needs her cochlear device from NH to communicate better.  No apparent distress noted.    Objective     Vital Signs    Temp:  [97.8 °F (36.6 °C)-98.7 °F (37.1 °C)] 97.8 °F (36.6 °C)  Heart Rate:  [] 79  Resp:  [16-20] 18  BP: (103-154)/(53-67) 103/53    Physical Exam:   General Appearance: alert, appears stated age and cooperative,  Hygiene:   fair  Gait & Station: Normal  Musculoskeletal: No tremors or abnormal involuntary movements    Mental Status Exam:   Cooperation:  Guarded  Eye Contact:  Good  Psychomotor Behavior:  Appropriate  Mood: Irritable and Anxious/Nervous  Affect:  mood-congruent  Speech:  Dysarthria  Thought Process:  Incoherent  Associations: Disorganized  Thought Content:     Mood congurent   Suicidal:  None   Homicidal:  None   Hallucinations:  None   Delusion:  Paranoid  Cognitive Functioning:   Consciousness: awake, alert and oriented  Reliability:  poor  Insight:  Poor  Judgement:  Poor  Impulse Control:  Poor    Lab Results (last 24 hours)     ** No results found for the last 24 hours. **        Imaging Results (last 24 hours)     ** No results found for the last 24 hours. **          Medicine:   Current Facility-Administered Medications:   •  acetaminophen (TYLENOL) tablet 650 mg, 650 mg, Oral, Q4H PRN, Faisal Jones MD, 650 mg at 11/11/17 2035  •  aluminum-magnesium hydroxide-simethicone (MAALOX MAX) 400-400-40 MG/5ML suspension 15 mL, 15 mL, Oral, Q6H PRN, Faisal Jones MD, 15  mL at 11/11/17 0147  •  atorvastatin (LIPITOR) tablet 40 mg, 40 mg, Oral, Nightly, Magali Cooper MD, 40 mg at 11/11/17 2042  •  clonazePAM (KlonoPIN) tablet 0.5 mg, 0.5 mg, Oral, Q8H, Magali Cooper MD, 0.5 mg at 11/12/17 0607  •  cyclobenzaprine (FLEXERIL) tablet 10 mg, 10 mg, Oral, Daily, Magali Cooper MD, 10 mg at 11/12/17 0849  •  divalproex (DEPAKOTE) DR tablet 250 mg, 250 mg, Oral, Daily, Magali Cooper MD, 250 mg at 11/12/17 0850  •  divalproex (DEPAKOTE) DR tablet 500 mg, 500 mg, Oral, Nightly, Magali Cooper MD, 500 mg at 11/11/17 2042  •  DULoxetine (CYMBALTA) DR capsule 60 mg, 60 mg, Oral, Daily, Magali Cooper MD, 60 mg at 11/12/17 0849  •  fenofibrate (TRICOR) tablet 145 mg, 145 mg, Oral, Daily, Magali Cooper MD, 145 mg at 11/12/17 0849  •  fluticasone (FLONASE) 50 MCG/ACT nasal spray 2 spray, 2 spray, Each Nare, Daily, Magali Cooper MD, 2 spray at 11/12/17 0850  •  gabapentin (NEURONTIN) capsule 600 mg, 600 mg, Oral, Q8H, Magali Cooper MD, 600 mg at 11/12/17 0607  •  hydrOXYzine (VISTARIL) capsule 50 mg, 50 mg, Oral, Q6H PRN, Faisal Jones MD, 50 mg at 11/12/17 0849  •  ipratropium-albuterol (DUO-NEB) nebulizer solution 3 mL, 3 mL, Nebulization, 4x Daily - RT, Magali Cooper MD, 3 mL at 11/12/17 0900  •  levothyroxine (SYNTHROID, LEVOTHROID) tablet 100 mcg, 100 mcg, Oral, Q AM, Magali Cooper MD, 100 mcg at 11/12/17 0607  •  lidocaine (LIDODERM) 5 % 1 patch, 1 patch, Transdermal, Q24H, Magali Cooper MD, 1 patch at 11/12/17 0851  •  loperamide (IMODIUM) capsule 2 mg, 2 mg, Oral, 4x Daily PRN, Faisal Jones MD  •  magnesium hydroxide (MILK OF MAGNESIA) suspension 2400 mg/10mL 10 mL, 10 mL, Oral, Daily PRN, Faisal Jones MD  •  nicotine (NICODERM CQ) 14 MG/24HR patch 1 patch, 1 patch, Transdermal, Q24H, Faisal Jones MD, 1 patch at 11/12/17 0852  •  sertraline (ZOLOFT) tablet 100 mg, 100 mg, Oral, Daily, Magali Cooper MD, 100 mg at 11/12/17 0849  •  traMADol  (ULTRAM) tablet 50 mg, 50 mg, Oral, Q6H PRN, Magali Cooper MD, 50 mg at 11/12/17 0849  •  traZODone (DESYREL) tablet 50 mg, 50 mg, Oral, Nightly PRN, Faisal Jones MD, 50 mg at 11/11/17 2035  •  triamcinolone (KENALOG) 0.1 % cream, , Topical, Q12H, Alhaji Elmore MD    Diagnoses/Assessment:   Active Problems:    Psychosis      Treatment Plan:    1) Will continue care for the patient on the behavioral health unit at [facility] to ensure patient safety.  2) Will continue to provide treatment with the unit milieu, activities, therapies and psychopharmacological management.  3) Patient to be placed on or continued on  Q15 minute checks  and Aggression precautions.  4) Will continue medical management by Dr. Elmore.  5) Will order following labs: none  6) Will make the following medication changes:none  7) Will continue discharge planning as appropriate for patient.  8) Psychotherapy provided: none    Treatment plan and medication risks and benefits discussed with: Patient    Magali Cooper MD  11/12/17  10:36 AM

## 2017-11-12 NOTE — NURSING NOTE
"Patient dozed for short time.  Presently awake making loud noises.  Attempted to explain she was waking peers up but she states her \"back hurts.\"  Patient given tramadol and vistaril.  Assisted with repositioning in bed.  She opted to sit on bedside.  "

## 2017-11-12 NOTE — NURSING NOTE
"Behavior   Anxiety 0  Depression 0  Pain 0  AVH no  S/I no  H/I no  Affect anxious and aggitated. Yelling. Resistant to care. Wandering. Trying to get out of door to [facility]. Loud. This RN writing all conversations to pt due to hearing loss. Appetite fair. \"I'm need to know where the elevator is because my family is waiting downstairs for me.\" pt frequently talking outloud as if in conversation.      Intervention  Instructed in medication usage and effects  Medications administered as ordered  Encouraged to verbalize needs      Response  Verbalized understanding   Did patient take medications as ordered yes   Did patient interact with assessment?  yes     Plan  Will monitor for safety  Will monitor every 15 minutes as ordered  Will evaluate and promote the plan of care  "

## 2017-11-12 NOTE — NURSING NOTE
"Up in [place] with brown paper bag with her clothes. aggitated and requesting to go home. Informed pt she is in the hospital. \"i lost my sister and nephew 3 days apart. I screamed all night. My back was hurting and I couldn't get comfortable. \"  Poor to fair eye contact. c/o pain low back radiating down both legs. 7/10 . Anxiety 8/10, depression 5/10, denies avh,s/i,h/i. No interaction with peers. Sits alone holding brown bag with clothes in it staring at exit door. Encouraged pt to interact with peer. \"I dont want to\" med complaint. Encouraged pt to notify staff of concerns. Moaning at times. Will continue to monitor for safety every 15 minutes.  "

## 2017-11-13 PROBLEM — F33.3 MAJOR DEPRESSIVE DISORDER, RECURRENT, SEVERE WITH PSYCHOTIC FEATURES (HCC): Status: ACTIVE | Noted: 2017-11-10

## 2017-11-13 PROBLEM — H91.93 BILATERAL DEAFNESS: Status: ACTIVE | Noted: 2017-11-13

## 2017-11-13 PROCEDURE — 94799 UNLISTED PULMONARY SVC/PX: CPT

## 2017-11-13 PROCEDURE — 99232 SBSQ HOSP IP/OBS MODERATE 35: CPT | Performed by: PSYCHIATRY & NEUROLOGY

## 2017-11-13 RX ORDER — CLONAZEPAM 0.5 MG/1
0.25 TABLET ORAL DAILY
Status: DISCONTINUED | OUTPATIENT
Start: 2017-11-18 | End: 2017-11-14 | Stop reason: CLARIF

## 2017-11-13 RX ORDER — RISPERIDONE 0.5 MG/1
0.5 TABLET ORAL EVERY 12 HOURS SCHEDULED
Status: DISCONTINUED | OUTPATIENT
Start: 2017-11-13 | End: 2017-11-14

## 2017-11-13 RX ORDER — CLONAZEPAM 0.5 MG/1
0.5 TABLET ORAL 2 TIMES DAILY
Status: DISCONTINUED | OUTPATIENT
Start: 2017-11-13 | End: 2017-11-14 | Stop reason: CLARIF

## 2017-11-13 RX ORDER — CLONAZEPAM 0.5 MG/1
0.25 TABLET ORAL 2 TIMES DAILY
Status: DISCONTINUED | OUTPATIENT
Start: 2017-11-15 | End: 2017-11-14 | Stop reason: CLARIF

## 2017-11-13 RX ADMIN — CLONAZEPAM 0.5 MG: 0.5 TABLET ORAL at 06:12

## 2017-11-13 RX ADMIN — RISPERIDONE 0.5 MG: 0.5 TABLET ORAL at 22:43

## 2017-11-13 RX ADMIN — DIVALPROEX SODIUM 500 MG: 250 TABLET, DELAYED RELEASE ORAL at 20:52

## 2017-11-13 RX ADMIN — GABAPENTIN 600 MG: 300 CAPSULE ORAL at 22:08

## 2017-11-13 RX ADMIN — SERTRALINE HYDROCHLORIDE 100 MG: 50 TABLET ORAL at 09:10

## 2017-11-13 RX ADMIN — FENOFIBRATE 145 MG: 145 TABLET ORAL at 09:10

## 2017-11-13 RX ADMIN — DIVALPROEX SODIUM 250 MG: 250 TABLET, DELAYED RELEASE ORAL at 09:10

## 2017-11-13 RX ADMIN — GABAPENTIN 600 MG: 300 CAPSULE ORAL at 06:12

## 2017-11-13 RX ADMIN — TRIAMCINOLONE ACETONIDE: 1 CREAM TOPICAL at 09:13

## 2017-11-13 RX ADMIN — DULOXETINE 60 MG: 60 CAPSULE, DELAYED RELEASE ORAL at 09:10

## 2017-11-13 RX ADMIN — FLUTICASONE PROPIONATE 2 SPRAY: 50 SPRAY, METERED NASAL at 09:11

## 2017-11-13 RX ADMIN — TRAMADOL HYDROCHLORIDE 50 MG: 50 TABLET, FILM COATED ORAL at 17:47

## 2017-11-13 RX ADMIN — TRAMADOL HYDROCHLORIDE 50 MG: 50 TABLET, FILM COATED ORAL at 10:33

## 2017-11-13 RX ADMIN — IPRATROPIUM BROMIDE AND ALBUTEROL SULFATE 3 ML: 2.5; .5 SOLUTION RESPIRATORY (INHALATION) at 18:50

## 2017-11-13 RX ADMIN — CYCLOBENZAPRINE HYDROCHLORIDE 10 MG: 10 TABLET, FILM COATED ORAL at 09:14

## 2017-11-13 RX ADMIN — ACETAMINOPHEN 650 MG: 325 TABLET ORAL at 22:08

## 2017-11-13 RX ADMIN — HYDROXYZINE PAMOATE 50 MG: 50 CAPSULE ORAL at 17:47

## 2017-11-13 RX ADMIN — ATORVASTATIN CALCIUM 40 MG: 40 TABLET, FILM COATED ORAL at 20:52

## 2017-11-13 RX ADMIN — ACETAMINOPHEN 650 MG: 325 TABLET ORAL at 10:34

## 2017-11-13 RX ADMIN — LIDOCAINE 1 PATCH: 50 PATCH CUTANEOUS at 09:11

## 2017-11-13 RX ADMIN — CLONAZEPAM 0.5 MG: 0.5 TABLET ORAL at 17:45

## 2017-11-13 RX ADMIN — RISPERIDONE 0.5 MG: 0.5 TABLET ORAL at 14:10

## 2017-11-13 RX ADMIN — LEVOTHYROXINE SODIUM 100 MCG: 100 TABLET ORAL at 06:12

## 2017-11-13 RX ADMIN — NICOTINE 1 PATCH: 14 PATCH, EXTENDED RELEASE TRANSDERMAL at 09:12

## 2017-11-13 RX ADMIN — GABAPENTIN 600 MG: 300 CAPSULE ORAL at 14:10

## 2017-11-13 RX ADMIN — TRIAMCINOLONE ACETONIDE: 1 CREAM TOPICAL at 22:43

## 2017-11-13 RX ADMIN — TRAZODONE HYDROCHLORIDE 50 MG: 50 TABLET ORAL at 20:52

## 2017-11-13 NOTE — SIGNIFICANT NOTE
11/13/17 1607   Individual Counseling   Time Session Began 1100   Time Session Ended 1130   Total Time (minutes) 30   Topic Initial psychosocial assessment   Session Detail CSW met with pt 1:1 and completed Initial psychosocial assessment   Patient Response PT DESCRIPTION: Pt presented in her room, lying in the fetal position on her bed. Pt. is deaf but have cochlear implantation but has lost hearing aids in the nursing home.  Pt was alert and oriented, know that she is in the hosp but don't know the reason why. Affect was sad ,speech is difficult to understand. Eye contact was fair. PROBLEM (PSYCOSIS): CSW tried to communicate twice using the dry erase board but pt. was very sleepy, the second time she did woke up but went back to sleep. CSW contacted sister who stated that the pt. had ear infection when she  was young and the after  put tubes in her ear, she has lost her hearing. No prior suicide attempt, no substance abuse hx,. Further sister states that pt. was living on her own before going to nursing home Pt seems to have suicidal behavior. CSW encourage pt. to attend group/individual therapy sessions. No significant substance abuse hx, no legal hx, Pt's sis stated that mom was an alcoholic and dad used to work away from home. No prior suicide attempts.  CSW will discuss plan of care with tx team and develop plan for pt. BPRS was completed upon admission.

## 2017-11-13 NOTE — PLAN OF CARE
"Problem: BH Patient Care Overview (Adult)  Goal: Plan of Care Review  Outcome: Ongoing (interventions implemented as appropriate)    11/13/17 1017   Coping/Psychosocial Response Interventions   Plan Of Care Reviewed With patient   Coping/Psychosocial   Patient Agreement with Plan of Care agrees   Patient Care Overview   Progress no change   Outcome Evaluation   Outcome Summary/Follow up Plan Ghazala fajardo tearful when she was told she would be staying for a few days. Communication was in writing from staff with her verbal responses. Hearing Impaired and Fall risk care plans added to plan of care.        Goal: Interdisciplinary Rounds/Family Conference  Outcome: Ongoing (interventions implemented as appropriate)    11/13/17 0947   Interdisciplinary Rounds/Family Conf   Participants psychiatrist;patient;nursing;social work;therapeutic recreation       Goal: Individualization and Mutuality  Outcome: Ongoing (interventions implemented as appropriate)    11/13/17 1017   Behavioral Health Screens   Patient Personal Strengths compliant with treatment/medications;stable living environment   Patient Vulnerabilities Deaf, \"I feel like every time I sign my name I get in deeper\"   Mutuality/Individual Preferences   What Anxieties, Fears or Concerns Do You Have About Your Health or Care? \"I want to go back to the nursing home\"   What Information Would Help Us Give You More Personalized Care? \"This was a mistake. I was supposed to get medical care\"       Goal: Discharge Needs Assessment  Outcome: Ongoing (interventions implemented as appropriate)    11/13/17 0947   Discharge Needs Assessment   Concerns To Be Addressed cognitive/perceptual concerns;mental health concerns   Readmission Within The Last 30 Days no previous admission in last 30 days           "

## 2017-11-13 NOTE — NURSING NOTE
Patient given trazodone and vistaril at hs, tramadol at 2330.  Meds did seem to help because she was able to fall asleep about midnight and has slept comfortably rest of night.  She was up all Sat. Night.

## 2017-11-13 NOTE — NURSING NOTE
Behavior   Anxiety 5  Depression 0  Pain 10  AVH no  S/I no  H/I no  Affect flat  Patient continues to yell, make weird noises.  She is intrusive with being loud in day area with peers there.  Not sure she knows she is making noise.   She is unable to converse with peers, noted because she makes noises peers tend to avoid her.  She continues to want to leave.  She can read but has difficulty carrying out simple instructions.  Noted nursing home told staff that cochlear implant device is broken, that is why it was not sent with patient.  Patient is a/o x 2.     Intervention  Offered snack, hs meds.  Continue to write down questions for her to answer.    Instructed in medication usage and effects  Medications administered as ordered  Encouraged to verbalize needs      Response  Noted patient has some difficulty expressing herself.  She ate snack, took meds incl. Pain meds, vistaril, trazodone.  Even with these meds patient continues to have hard time sleeping.  Verbalized understanding   Did patient take medications as ordered yes   Did patient interact with assessment?  yes     Plan  Will monitor for safety  Will monitor every 15 minutes as ordered  Will evaluate and promote the plan of care

## 2017-11-13 NOTE — PLAN OF CARE
Problem: BH Patient Care Overview (Adult)  Goal: Plan of Care Review  Outcome: Ongoing (interventions implemented as appropriate)  Goal: Individualization and Mutuality  Outcome: Ongoing (interventions implemented as appropriate)  Goal: Discharge Needs Assessment  Outcome: Ongoing (interventions implemented as appropriate)    Problem: BH Overarching Goals  Goal: Adheres to Safety Considerations for Self and Others  Outcome: Ongoing (interventions implemented as appropriate)  Goal: Optimized Coping Skills in Response to Life Stressors  Outcome: Ongoing (interventions implemented as appropriate)  Goal: Develops/Participates in Therapeutic [place] to Support Successful Transition  Outcome: Ongoing (interventions implemented as appropriate)

## 2017-11-13 NOTE — PLAN OF CARE
Problem: BH Patient Care Overview (Adult)  Goal: Plan of Care Review  Outcome: Ongoing (interventions implemented as appropriate)  Goal: Interdisciplinary Rounds/Family Conference  Outcome: Ongoing (interventions implemented as appropriate)  Goal: Individualization and Mutuality  Outcome: Ongoing (interventions implemented as appropriate)  Goal: Discharge Needs Assessment  Outcome: Ongoing (interventions implemented as appropriate)    Problem:  Overarching Goals  Goal: Adheres to Safety Considerations for Self and Others  Outcome: Ongoing (interventions implemented as appropriate)  Goal: Optimized Coping Skills in Response to Life Stressors  Outcome: Ongoing (interventions implemented as appropriate)  Goal: Develops/Participates in Therapeutic [place] to Support Successful Transition  Outcome: Ongoing (interventions implemented as appropriate)

## 2017-11-14 LAB
ALBUMIN SERPL-MCNC: 4.5 G/DL (ref 3.4–4.8)
ALBUMIN/GLOB SERPL: 1.3 G/DL (ref 1.1–1.8)
ALP SERPL-CCNC: 150 U/L (ref 38–126)
ALT SERPL W P-5'-P-CCNC: 52 U/L (ref 9–52)
AMMONIA BLD-SCNC: <9 UMOL/L (ref 9–30)
ANION GAP SERPL CALCULATED.3IONS-SCNC: 13 MMOL/L (ref 5–15)
ARTICHOKE IGE QN: 85 MG/DL (ref 1–129)
AST SERPL-CCNC: 76 U/L (ref 14–36)
BILIRUB SERPL-MCNC: 0.6 MG/DL (ref 0.2–1.3)
BUN BLD-MCNC: 13 MG/DL (ref 7–21)
BUN/CREAT SERPL: 14.4 (ref 7–25)
CALCIUM SPEC-SCNC: 10.1 MG/DL (ref 8.4–10.2)
CHLORIDE SERPL-SCNC: 99 MMOL/L (ref 95–110)
CHOLEST SERPL-MCNC: 138 MG/DL (ref 0–199)
CO2 SERPL-SCNC: 29 MMOL/L (ref 22–31)
CREAT BLD-MCNC: 0.9 MG/DL (ref 0.5–1)
GFR SERPL CREATININE-BSD FRML MDRD: 64 ML/MIN/1.73 (ref 45–104)
GLOBULIN UR ELPH-MCNC: 3.4 GM/DL (ref 2.3–3.5)
GLUCOSE BLD-MCNC: 97 MG/DL (ref 60–100)
HDLC SERPL-MCNC: 35 MG/DL (ref 60–200)
LDLC/HDLC SERPL: 2.14 {RATIO} (ref 0–3.22)
POTASSIUM BLD-SCNC: 4 MMOL/L (ref 3.5–5.1)
PROT SERPL-MCNC: 7.9 G/DL (ref 6.3–8.6)
SODIUM BLD-SCNC: 141 MMOL/L (ref 137–145)
TRIGL SERPL-MCNC: 141 MG/DL (ref 20–199)
TSH SERPL DL<=0.05 MIU/L-ACNC: 24.6 MIU/ML (ref 0.46–4.68)
VALPROATE SERPL-MCNC: 73.4 MCG/ML (ref 50–120)
WHOLE BLOOD HOLD SPECIMEN: NORMAL

## 2017-11-14 PROCEDURE — 94760 N-INVAS EAR/PLS OXIMETRY 1: CPT

## 2017-11-14 PROCEDURE — 94799 UNLISTED PULMONARY SVC/PX: CPT

## 2017-11-14 PROCEDURE — 80061 LIPID PANEL: CPT | Performed by: PSYCHIATRY & NEUROLOGY

## 2017-11-14 PROCEDURE — 80164 ASSAY DIPROPYLACETIC ACD TOT: CPT | Performed by: PSYCHIATRY & NEUROLOGY

## 2017-11-14 PROCEDURE — 82140 ASSAY OF AMMONIA: CPT | Performed by: PSYCHIATRY & NEUROLOGY

## 2017-11-14 PROCEDURE — 99232 SBSQ HOSP IP/OBS MODERATE 35: CPT | Performed by: PSYCHIATRY & NEUROLOGY

## 2017-11-14 PROCEDURE — 80053 COMPREHEN METABOLIC PANEL: CPT | Performed by: PSYCHIATRY & NEUROLOGY

## 2017-11-14 PROCEDURE — 84443 ASSAY THYROID STIM HORMONE: CPT | Performed by: PSYCHIATRY & NEUROLOGY

## 2017-11-14 RX ORDER — CLONAZEPAM 0.5 MG/1
0.25 TABLET ORAL 2 TIMES DAILY
Status: COMPLETED | OUTPATIENT
Start: 2017-11-15 | End: 2017-11-17

## 2017-11-14 RX ORDER — CLONAZEPAM 0.5 MG/1
0.5 TABLET ORAL 2 TIMES DAILY
Status: COMPLETED | OUTPATIENT
Start: 2017-11-14 | End: 2017-11-15

## 2017-11-14 RX ORDER — CLONAZEPAM 0.5 MG/1
0.25 TABLET ORAL DAILY
Status: DISCONTINUED | OUTPATIENT
Start: 2017-11-18 | End: 2017-11-14

## 2017-11-14 RX ORDER — RISPERIDONE 1 MG/1
1 TABLET ORAL EVERY 12 HOURS SCHEDULED
Status: DISCONTINUED | OUTPATIENT
Start: 2017-11-14 | End: 2017-11-16

## 2017-11-14 RX ORDER — CLONAZEPAM 0.5 MG/1
0.25 TABLET ORAL DAILY
Status: COMPLETED | OUTPATIENT
Start: 2017-11-18 | End: 2017-11-19

## 2017-11-14 RX ORDER — CLONAZEPAM 0.5 MG/1
0.25 TABLET ORAL 2 TIMES DAILY
Status: DISCONTINUED | OUTPATIENT
Start: 2017-11-15 | End: 2017-11-14 | Stop reason: SDUPTHER

## 2017-11-14 RX ADMIN — TRAMADOL HYDROCHLORIDE 50 MG: 50 TABLET, FILM COATED ORAL at 11:28

## 2017-11-14 RX ADMIN — ATORVASTATIN CALCIUM 40 MG: 40 TABLET, FILM COATED ORAL at 20:00

## 2017-11-14 RX ADMIN — SERTRALINE HYDROCHLORIDE 50 MG: 50 TABLET ORAL at 08:36

## 2017-11-14 RX ADMIN — NICOTINE 1 PATCH: 14 PATCH, EXTENDED RELEASE TRANSDERMAL at 08:43

## 2017-11-14 RX ADMIN — DIVALPROEX SODIUM 500 MG: 250 TABLET, DELAYED RELEASE ORAL at 20:01

## 2017-11-14 RX ADMIN — LEVOTHYROXINE SODIUM 100 MCG: 100 TABLET ORAL at 06:18

## 2017-11-14 RX ADMIN — RISPERIDONE 1 MG: 1 TABLET ORAL at 20:01

## 2017-11-14 RX ADMIN — GABAPENTIN 600 MG: 300 CAPSULE ORAL at 06:17

## 2017-11-14 RX ADMIN — CLONAZEPAM 0.5 MG: 0.5 TABLET ORAL at 08:36

## 2017-11-14 RX ADMIN — CYCLOBENZAPRINE HYDROCHLORIDE 10 MG: 10 TABLET, FILM COATED ORAL at 08:41

## 2017-11-14 RX ADMIN — IPRATROPIUM BROMIDE AND ALBUTEROL SULFATE 3 ML: 2.5; .5 SOLUTION RESPIRATORY (INHALATION) at 18:46

## 2017-11-14 RX ADMIN — TRAZODONE HYDROCHLORIDE 50 MG: 50 TABLET ORAL at 20:01

## 2017-11-14 RX ADMIN — HYDROXYZINE PAMOATE 50 MG: 50 CAPSULE ORAL at 16:30

## 2017-11-14 RX ADMIN — GABAPENTIN 600 MG: 300 CAPSULE ORAL at 15:00

## 2017-11-14 RX ADMIN — GABAPENTIN 600 MG: 300 CAPSULE ORAL at 21:17

## 2017-11-14 RX ADMIN — HYDROXYZINE PAMOATE 50 MG: 50 CAPSULE ORAL at 08:45

## 2017-11-14 RX ADMIN — RISPERIDONE 0.5 MG: 0.5 TABLET ORAL at 08:35

## 2017-11-14 RX ADMIN — CLONAZEPAM 0.5 MG: 0.5 TABLET ORAL at 17:39

## 2017-11-14 RX ADMIN — ALUMINUM HYDROXIDE, MAGNESIUM HYDROXIDE, AND DIMETHICONE 15 ML: 400; 400; 40 SUSPENSION ORAL at 23:09

## 2017-11-14 RX ADMIN — FLUTICASONE PROPIONATE 2 SPRAY: 50 SPRAY, METERED NASAL at 08:38

## 2017-11-14 RX ADMIN — TRIAMCINOLONE ACETONIDE 1 APPLICATION: 1 CREAM TOPICAL at 22:09

## 2017-11-14 RX ADMIN — TRIAMCINOLONE ACETONIDE: 1 CREAM TOPICAL at 08:44

## 2017-11-14 RX ADMIN — FENOFIBRATE 145 MG: 145 TABLET ORAL at 08:35

## 2017-11-14 RX ADMIN — IPRATROPIUM BROMIDE AND ALBUTEROL SULFATE 3 ML: 2.5; .5 SOLUTION RESPIRATORY (INHALATION) at 14:45

## 2017-11-14 RX ADMIN — DIVALPROEX SODIUM 250 MG: 250 TABLET, DELAYED RELEASE ORAL at 08:35

## 2017-11-14 RX ADMIN — DULOXETINE 60 MG: 60 CAPSULE, DELAYED RELEASE ORAL at 08:42

## 2017-11-14 RX ADMIN — LIDOCAINE 1 PATCH: 50 PATCH CUTANEOUS at 08:42

## 2017-11-14 NOTE — PLAN OF CARE
Problem: BH Overarching Goals  Goal: Adheres to Safety Considerations for Self and Others  Outcome: Ongoing (interventions implemented as appropriate)  Goal: Optimized Coping Skills in Response to Life Stressors  Outcome: Ongoing (interventions implemented as appropriate)  Goal: Develops/Participates in Therapeutic [place] to Support Successful Transition  Outcome: Ongoing (interventions implemented as appropriate)    Problem: Alteration in Thoughts and Perception  Goal: Verbalize thoughts and feelings associated with:  Outcome: Ongoing (interventions implemented as appropriate)  Goal: Refrain from acting on delusional thinking/internal stimuli  Outcome: Ongoing (interventions implemented as appropriate)  Goal: Agree to be compliant with medication regime, as prescribed and report medication side effects  Outcome: Ongoing (interventions implemented as appropriate)  Goal: Attend and participate in unit activities, including therapeutic, recreational, and educational groups  Outcome: Ongoing (interventions implemented as appropriate)  Goal: Complete daily ADLs, including personal hygiene independently, as able  Outcome: Ongoing (interventions implemented as appropriate)    Problem: Hearing Impairment/Deaf (Adult,Obstetrics,Pediatric)  Goal: Identify Related Risk Factors and Signs and Symptoms  Outcome: Ongoing (interventions implemented as appropriate)  Goal: Enhanced Communication  Outcome: Ongoing (interventions implemented as appropriate)    Problem: Fall Risk (Adult)  Goal: Identify Related Risk Factors and Signs and Symptoms  Outcome: Ongoing (interventions implemented as appropriate)  Goal: Absence of Falls  Outcome: Ongoing (interventions implemented as appropriate)

## 2017-11-14 NOTE — PROGRESS NOTES
11/14/2017    Chief Complaint: psychosis    Subjective:  Patient is a 62 y.o. female who was hospitalized for psychosis.   Since yesterday the patient has continued to have AH and delusions.  She tells me her nephew is telling her that he is waiting for her in the car out back and she asks me to let her go.  Patient is not able to hear and we communicate via writing.    Objective     Vital Signs    Temp:  [96.5 °F (35.8 °C)-96.8 °F (36 °C)] 96.5 °F (35.8 °C)  Heart Rate:  [] 99  Resp:  [18] 18  BP: (105-114)/(61-64) 105/64    Physical Exam:   General Appearance: alert, appears stated age and cooperative,  Hygiene:   good  Gait & Station: Normal  Musculoskeletal: No tremors or abnormal involuntary movements    Mental Status Exam:   Cooperation:  Cooperative  Eye Contact:  Good  Psychomotor Behavior:  Appropriate  Mood: Anxious/Nervous and Worried  Affect:  constricted  Speech:  Normal  Thought Process:  [place]  Associations: Goal Directed  Thought Content:     Bizarre   Suicidal:  None   Homicidal:  None   Hallucinations:  Auditory   Delusion:  Various delusions  Cognitive Functioning:   Consciousness: awake, alert and confused  Reliability:  poor  Insight:  Poor  Judgement:  Poor  Impulse Control:  Poor    Lab Results (last 24 hours)     Procedure Component Value Units Date/Time    Comprehensive Metabolic Panel [[id number]]  (Abnormal) Collected:  11/14/17 0608    Specimen:  Blood Updated:  11/14/17 0629     Glucose 97 mg/dL      BUN 13 mg/dL      Creatinine 0.90 mg/dL      Sodium 141 mmol/L      Potassium 4.0 mmol/L      Chloride 99 mmol/L      CO2 29.0 mmol/L      Calcium 10.1 mg/dL      Total Protein 7.9 g/dL      Albumin 4.50 g/dL      ALT (SGPT) 52 U/L      AST (SGOT) 76 (H) U/L      Alkaline Phosphatase 150 (H) U/L      Total Bilirubin 0.6 mg/dL      eGFR Non African Amer 64 mL/min/1.73      Globulin 3.4 gm/dL      A/G Ratio 1.3 g/dL      BUN/Creatinine Ratio 14.4     Anion Gap 13.0 mmol/L     Ammonia  [[id number]]  (Abnormal) Collected:  11/14/17 0608    Specimen:  Blood Updated:  11/14/17 0633     Ammonia <9 (L) umol/L     Valproic Acid Level, Total [[id number]]  (Normal) Collected:  11/14/17 0608    Specimen:  Blood Updated:  11/14/17 0644     Valproic Acid 73.4 mcg/mL     Lipid Panel [[id number]]  (Abnormal) Collected:  11/14/17 0608    Specimen:  Blood Updated:  11/14/17 0644     Total Cholesterol 138 mg/dL      Triglycerides 141 mg/dL      HDL Cholesterol 35 (L) mg/dL      LDL Cholesterol  85 mg/dL      LDL/HDL Ratio 2.14    TSH [[id number]]  (Abnormal) Collected:  11/14/17 0608    Specimen:  Blood Updated:  11/14/17 0717     TSH 24.600 (H) mIU/mL     Extra Tubes [[id number]] Collected:  11/14/17 0608    Specimen:  Blood from Blood, Venous Line Updated:  11/14/17 0731    Narrative:       The following orders were created for panel order Extra Tubes.  Procedure                               Abnormality         Status                     ---------                               -----------         ------                     Lavender Top[[id number]]                                     Final result                 Please view results for these tests on the individual orders.    Lavender Top [[id number]] Collected:  11/14/17 0608    Specimen:  Blood Updated:  11/14/17 0731     Extra Tube hold for add-on      Auto resulted           Imaging Results (last 24 hours)     ** No results found for the last 24 hours. **          Medicine:   Current Facility-Administered Medications:   •  acetaminophen (TYLENOL) tablet 650 mg, 650 mg, Oral, Q4H PRN, Faisal Jones MD, 650 mg at 11/13/17 2208  •  aluminum-magnesium hydroxide-simethicone (MAALOX MAX) 400-400-40 MG/5ML suspension 15 mL, 15 mL, Oral, Q6H PRN, Faisal Jones MD, 15 mL at 11/11/17 0147  •  atorvastatin (LIPITOR) tablet 40 mg, 40 mg, Oral, Nightly, Magali Cooper MD, 40 mg at 11/13/17 2052  •  [START ON 11/15/2017] clonazePAM (KlonoPIN) tablet 0.25 mg, 0.25 mg,  Oral, BID, Faisal Jones MD  •  [START ON 11/18/2017] clonazePAM (KlonoPIN) tablet 0.25 mg, 0.25 mg, Oral, Daily, Faisal Jones MD  •  clonazePAM (KlonoPIN) tablet 0.5 mg, 0.5 mg, Oral, BID, Faisal Jones MD  •  cyclobenzaprine (FLEXERIL) tablet 10 mg, 10 mg, Oral, Daily, Magali Cooper MD, 10 mg at 11/14/17 0841  •  divalproex (DEPAKOTE) DR tablet 250 mg, 250 mg, Oral, Daily, Magali Cooper MD, 250 mg at 11/14/17 0835  •  divalproex (DEPAKOTE) DR tablet 500 mg, 500 mg, Oral, Nightly, Magali Cooper MD, 500 mg at 11/13/17 2052  •  DULoxetine (CYMBALTA) DR capsule 60 mg, 60 mg, Oral, Daily, Magali Cooper MD, 60 mg at 11/14/17 0842  •  fenofibrate (TRICOR) tablet 145 mg, 145 mg, Oral, Daily, Magali Cooper MD, 145 mg at 11/14/17 0835  •  fluticasone (FLONASE) 50 MCG/ACT nasal spray 2 spray, 2 spray, Each Nare, Daily, Magali Cooper MD, 2 spray at 11/14/17 0838  •  gabapentin (NEURONTIN) capsule 600 mg, 600 mg, Oral, Q8H, Magali Cooper MD, 600 mg at 11/14/17 0617  •  hydrOXYzine (VISTARIL) capsule 50 mg, 50 mg, Oral, Q6H PRN, Faisal Jones MD, 50 mg at 11/14/17 0845  •  ipratropium-albuterol (DUO-NEB) nebulizer solution 3 mL, 3 mL, Nebulization, 4x Daily - RT, Magali Cooper MD, 3 mL at 11/13/17 1850  •  levothyroxine (SYNTHROID, LEVOTHROID) tablet 100 mcg, 100 mcg, Oral, Q AM, Magali Cooper MD, 100 mcg at 11/14/17 0618  •  lidocaine (LIDODERM) 5 % 1 patch, 1 patch, Transdermal, Q24H, Magali Cooper MD, 1 patch at 11/14/17 0842  •  loperamide (IMODIUM) capsule 2 mg, 2 mg, Oral, 4x Daily PRN, Faisal Jones MD  •  magnesium hydroxide (MILK OF MAGNESIA) suspension 2400 mg/10mL 10 mL, 10 mL, Oral, Daily PRN, Faisal Jones MD  •  nicotine (NICODERM CQ) 14 MG/24HR patch 1 patch, 1 patch, Transdermal, Q24H, Faisal Jones MD, 1 patch at 11/14/17 0843  •  risperiDONE (risperDAL) tablet 0.5 mg, 0.5 mg, Oral, Q12H, Faisal Jones MD, 0.5 mg at 11/14/17 0835  •  traMADol (ULTRAM)  tablet 50 mg, 50 mg, Oral, Q6H PRN, Magali Cooper MD, 50 mg at 11/14/17 1128  •  traZODone (DESYREL) tablet 50 mg, 50 mg, Oral, Nightly PRN, Faisal Jones MD, 50 mg at 11/13/17 2052  •  triamcinolone (KENALOG) 0.1 % cream, , Topical, Q12H, Alhaji Elmore MD    Diagnoses/Assessment:   Principal Problem:    Major depressive disorder, recurrent, severe with psychotic features  Active Problems:    Bilateral deafness      Treatment Plan:    1) Will continue care for the patient on the behavioral health unit at [facility] to ensure patient safety.  2) Will continue to provide treatment with the unit milieu, activities, therapies and psychopharmacological management.  3) Patient to be placed on or continued on  Q15 minute checks  and Elopement and Fall precautions.  4) Will continue medical management by Dr. Elmore.  5) Will order following labs: none  6) Will make the following medication changes: Will increase the risperdal to 1mg bid.  Will continue the klonopin taper.  Will stop the zoloft.  Will continue the cymbalta and depakote.  7) Will continue discharge planning as appropriate for patient.  8) Psychotherapy provided: none    Treatment plan and medication risks and benefits discussed with: Treatment Team    Faisal Jones MD  11/14/17  1:56 PM

## 2017-11-14 NOTE — NURSING NOTE
"Behavior   Anxiety 10  Depression 10  Pain 8  AVH no  S/I no  H/I no  Affect very depressed    Patient found in day room socializing with others. Patient denies SI/HI/AVH at this time. Patient c/o pain. \"I have fibromyalgia really bad.\" Patient asked for more medication on several occasions. Medications administered and relaxation techniques encouraged.  Patient is currently asleep in bed, needs met. Will continue to monitor.    Intervention  Medications reviewed and administered  Assessment complete    Response  Verbalized understanding   Took medications  Interacted with assessment    Plan  Will promote and reinforce current treatment plan and encourage involvement in care plan goals.   Will provide for safe, calm, quiet environment.  Will promote open communication with staff and foster a trusting/working relationship with patient.   Will promote participation in groups and therapies and independent reflection.        "

## 2017-11-14 NOTE — NURSING NOTE
"Sitting at table with left index finger In her ear talking out loud as if in conversation.wandering in [place] off and on. \"dont let them lock me in here. My sister . I have to leave.\"  "

## 2017-11-14 NOTE — PLAN OF CARE
Problem:  Patient Care Overview (Adult)  Goal: Individualization and Mutuality  Outcome: Ongoing (interventions implemented as appropriate)  Goal: Discharge Needs Assessment  Outcome: Ongoing (interventions implemented as appropriate)    Problem:  Overarching Goals  Goal: Adheres to Safety Considerations for Self and Others  Outcome: Ongoing (interventions implemented as appropriate)  Goal: Optimized Coping Skills in Response to Life Stressors  Outcome: Ongoing (interventions implemented as appropriate)  Goal: Develops/Participates in Therapeutic [place] to Support Successful Transition  Outcome: Ongoing (interventions implemented as appropriate)    Problem: Alteration in Thoughts and Perception  Goal: Complete daily ADLs, including personal hygiene independently, as able  Outcome: Ongoing (interventions implemented as appropriate)    Problem: Hearing Impairment/Deaf (Adult,Obstetrics,Pediatric)  Goal: Identify Related Risk Factors and Signs and Symptoms  Outcome: Ongoing (interventions implemented as appropriate)  Goal: Enhanced Communication  Outcome: Ongoing (interventions implemented as appropriate)    Problem: Fall Risk (Adult)  Goal: Identify Related Risk Factors and Signs and Symptoms  Outcome: Ongoing (interventions implemented as appropriate)  Goal: Absence of Falls  Outcome: Ongoing (interventions implemented as appropriate)

## 2017-11-15 PROCEDURE — 94799 UNLISTED PULMONARY SVC/PX: CPT

## 2017-11-15 PROCEDURE — 99232 SBSQ HOSP IP/OBS MODERATE 35: CPT | Performed by: PSYCHIATRY & NEUROLOGY

## 2017-11-15 PROCEDURE — 94760 N-INVAS EAR/PLS OXIMETRY 1: CPT

## 2017-11-15 RX ORDER — LEVOTHYROXINE SODIUM 112 UG/1
112 TABLET ORAL
Status: DISCONTINUED | OUTPATIENT
Start: 2017-11-16 | End: 2017-11-20 | Stop reason: HOSPADM

## 2017-11-15 RX ORDER — DIVALPROEX SODIUM 250 MG/1
250 TABLET, DELAYED RELEASE ORAL EVERY 12 HOURS SCHEDULED
Status: DISCONTINUED | OUTPATIENT
Start: 2017-11-15 | End: 2017-11-16

## 2017-11-15 RX ORDER — ALBUTEROL SULFATE 2.5 MG/3ML
2.5 SOLUTION RESPIRATORY (INHALATION) EVERY 6 HOURS PRN
Status: DISCONTINUED | OUTPATIENT
Start: 2017-11-15 | End: 2017-11-15

## 2017-11-15 RX ADMIN — TRAMADOL HYDROCHLORIDE 50 MG: 50 TABLET, FILM COATED ORAL at 05:41

## 2017-11-15 RX ADMIN — LEVOTHYROXINE SODIUM 100 MCG: 100 TABLET ORAL at 05:41

## 2017-11-15 RX ADMIN — CLONAZEPAM 0.5 MG: 0.5 TABLET ORAL at 08:23

## 2017-11-15 RX ADMIN — FENOFIBRATE 145 MG: 145 TABLET ORAL at 08:23

## 2017-11-15 RX ADMIN — TRAZODONE HYDROCHLORIDE 50 MG: 50 TABLET ORAL at 20:12

## 2017-11-15 RX ADMIN — CYCLOBENZAPRINE HYDROCHLORIDE 10 MG: 10 TABLET, FILM COATED ORAL at 08:24

## 2017-11-15 RX ADMIN — DIVALPROEX SODIUM 250 MG: 250 TABLET, DELAYED RELEASE ORAL at 20:12

## 2017-11-15 RX ADMIN — IPRATROPIUM BROMIDE AND ALBUTEROL SULFATE 3 ML: 2.5; .5 SOLUTION RESPIRATORY (INHALATION) at 07:30

## 2017-11-15 RX ADMIN — RISPERIDONE 1 MG: 1 TABLET ORAL at 08:23

## 2017-11-15 RX ADMIN — GABAPENTIN 600 MG: 300 CAPSULE ORAL at 21:30

## 2017-11-15 RX ADMIN — DIVALPROEX SODIUM 250 MG: 250 TABLET, DELAYED RELEASE ORAL at 08:23

## 2017-11-15 RX ADMIN — GABAPENTIN 600 MG: 300 CAPSULE ORAL at 13:54

## 2017-11-15 RX ADMIN — CLONAZEPAM 0.25 MG: 0.5 TABLET ORAL at 17:07

## 2017-11-15 RX ADMIN — FLUTICASONE PROPIONATE 2 SPRAY: 50 SPRAY, METERED NASAL at 08:24

## 2017-11-15 RX ADMIN — ATORVASTATIN CALCIUM 40 MG: 40 TABLET, FILM COATED ORAL at 20:12

## 2017-11-15 RX ADMIN — GABAPENTIN 600 MG: 300 CAPSULE ORAL at 05:41

## 2017-11-15 RX ADMIN — TRAMADOL HYDROCHLORIDE 50 MG: 50 TABLET, FILM COATED ORAL at 20:59

## 2017-11-15 RX ADMIN — LIDOCAINE 1 PATCH: 50 PATCH CUTANEOUS at 08:20

## 2017-11-15 RX ADMIN — DULOXETINE 60 MG: 60 CAPSULE, DELAYED RELEASE ORAL at 08:23

## 2017-11-15 RX ADMIN — NICOTINE 1 PATCH: 14 PATCH, EXTENDED RELEASE TRANSDERMAL at 08:20

## 2017-11-15 RX ADMIN — RISPERIDONE 1 MG: 1 TABLET ORAL at 20:12

## 2017-11-15 RX ADMIN — IPRATROPIUM BROMIDE AND ALBUTEROL SULFATE 3 ML: 2.5; .5 SOLUTION RESPIRATORY (INHALATION) at 11:10

## 2017-11-15 NOTE — PLAN OF CARE
Problem: Alteration in Thoughts and Perception  Goal: Refrain from acting on delusional thinking/internal stimuli  Outcome: Ongoing (interventions implemented as appropriate)  Goal: Agree to be compliant with medication regime, as prescribed and report medication side effects  Outcome: Ongoing (interventions implemented as appropriate)  Goal: Attend and participate in unit activities, including therapeutic, recreational, and educational groups  Outcome: Ongoing (interventions implemented as appropriate)  Goal: Complete daily ADLs, including personal hygiene independently, as able  Outcome: Ongoing (interventions implemented as appropriate)    Problem: Hearing Impairment/Deaf (Adult,Obstetrics,Pediatric)  Goal: Identify Related Risk Factors and Signs and Symptoms  Outcome: Ongoing (interventions implemented as appropriate)  Goal: Enhanced Communication  Outcome: Ongoing (interventions implemented as appropriate)    Problem: Fall Risk (Adult)  Goal: Absence of Falls  Outcome: Ongoing (interventions implemented as appropriate)

## 2017-11-15 NOTE — NURSING NOTE
"Reported by PT that pt had fallen in the hallway floor. Pt was found sitting in the [place]. Assisted up by staff and VS taken. 115/59 P.96 Sats 97%. Pt reports \"I was just going around and around\".  Pt denies injury. Ambulating with assistance, with no difficulty.  present and made aware of the fall.   "

## 2017-11-15 NOTE — NURSING NOTE
"Behavior   Anxiety 6  Depression 10  Pain 8  AVH yes   S/I no  H/I no  Affect labile    Pt has been wandering hallways and appears to be looking for exits. Pt has been hitting/ knocking on doors and carrying a bag of her belongings around the halls. Pt appeared to be talking to herself and getting responses while sitting at the table in the common area. Pt yelled \"here rena rena\" and has said \"I can't stand when people are mean to animals\". Pt is still wandering halls asking to go home. Pt appears to be in a good mood at times, but is angry at other times and called staff \"lazy\" at the beginning of shift. Pt was interviewed by 2 different RNs, but was not consistent with information.     Intervention  Instructed in medication usage and effects  Medications administered as ordered  Encouraged to verbalize needs      Response  Verbalized understanding   Did patient take medications as ordered yes   Did patient interact with assessment?  yes     Plan  Will monitor for safety  Will monitor every 15 minutes as ordered  Will evaluate and promote the plan of care    "

## 2017-11-15 NOTE — PLAN OF CARE
Problem: BH Patient Care Overview (Adult)  Goal: Individualization and Mutuality  Outcome: Ongoing (interventions implemented as appropriate)  Goal: Discharge Needs Assessment  Outcome: Ongoing (interventions implemented as appropriate)    Problem: BH Overarching Goals  Goal: Adheres to Safety Considerations for Self and Others  Outcome: Ongoing (interventions implemented as appropriate)  Goal: Optimized Coping Skills in Response to Life Stressors  Outcome: Ongoing (interventions implemented as appropriate)  Goal: Develops/Participates in Therapeutic [place] to Support Successful Transition  Outcome: Ongoing (interventions implemented as appropriate)    Problem: Alteration in Thoughts and Perception  Goal: Verbalize thoughts and feelings associated with:  Outcome: Ongoing (interventions implemented as appropriate)  Goal: Refrain from acting on delusional thinking/internal stimuli  Outcome: Ongoing (interventions implemented as appropriate)  Goal: Agree to be compliant with medication regime, as prescribed and report medication side effects  Outcome: Ongoing (interventions implemented as appropriate)  Goal: Attend and participate in unit activities, including therapeutic, recreational, and educational groups  Outcome: Ongoing (interventions implemented as appropriate)  Goal: Complete daily ADLs, including personal hygiene independently, as able  Outcome: Ongoing (interventions implemented as appropriate)    Problem: Hearing Impairment/Deaf (Adult,Obstetrics,Pediatric)  Goal: Identify Related Risk Factors and Signs and Symptoms  Outcome: Ongoing (interventions implemented as appropriate)  Goal: Enhanced Communication  Outcome: Ongoing (interventions implemented as appropriate)    Problem: Fall Risk (Adult)  Goal: Identify Related Risk Factors and Signs and Symptoms  Outcome: Ongoing (interventions implemented as appropriate)  Goal: Absence of Falls  Outcome: Ongoing (interventions implemented as appropriate)

## 2017-11-15 NOTE — NURSING NOTE
Behavior   Anxiety 0  Depression 0  Pain 0  AVH no  S/I no  H/I no  Affect flat      Intervention  Instructed in medication usage and effects  Medications administered as ordered  Encouraged to verbalize needs      Response  Verbalized understanding   Did patient take medications as ordered yes   Did patient interact with assessment?  yes     Plan  Will monitor for safety  Will monitor every 15 minutes as ordered  Will evaluate and promote the plan of care

## 2017-11-15 NOTE — NURSING NOTE
Pt has been ambulating with her walker this afternoon. No outbursts or inappropriate behavior. No further episodes of falling. She does talk frequently about her sister and nephew.

## 2017-11-15 NOTE — PROGRESS NOTES
11/15/2017    Chief Complaint: psychosis    Subjective:  Patient is a 62 y.o. female who was hospitalized for psychosis.   Since yesterday the patient has continued to be delusional about her family being dead.  She may be hearing her nephew.  She continues to want to leave and believes her family is waiting for her to go shopping for [employer].  She has a cochlear implant but she lost or broke it and and so communication is via writing.    Objective     Vital Signs    Temp:  [96.7 °F (35.9 °C)-97.2 °F (36.2 °C)] 96.7 °F (35.9 °C)  Heart Rate:  [] 96  Resp:  [17-22] 18  BP: ()/(51-60) 115/59    Physical Exam:   General Appearance: alert, appears stated age and cooperative,  Hygiene:   good  Gait & Station: Normal  Musculoskeletal: No tremors or abnormal involuntary movements    Mental Status Exam:   Cooperation:  Cooperative  Eye Contact:  Good  Psychomotor Behavior:  Appropriate  Mood: Anxious/Nervous  Affect:  constricted  Speech:  Normal  Thought Process:  [place]  Associations: Goal Directed  Thought Content:     delusional   Suicidal:  None   Homicidal:  None   Hallucinations:  Auditory   Delusion:  Delusional about family being dead.  Cognitive Functioning:   Consciousness: awake, alert and confused  Reliability:  poor  Insight:  Poor  Judgement:  Poor  Impulse Control:  Poor    Lab Results (last 24 hours)     ** No results found for the last 24 hours. **        Imaging Results (last 24 hours)     ** No results found for the last 24 hours. **          Medicine:   Current Facility-Administered Medications:   •  acetaminophen (TYLENOL) tablet 650 mg, 650 mg, Oral, Q4H PRN, Faisal Jones MD, 650 mg at 11/13/17 2208  •  aluminum-magnesium hydroxide-simethicone (MAALOX MAX) 400-400-40 MG/5ML suspension 15 mL, 15 mL, Oral, Q6H PRN, Faisal Jones MD, 15 mL at 11/14/17 2309  •  atorvastatin (LIPITOR) tablet 40 mg, 40 mg, Oral, Nightly, Magali Cooper MD, 40 mg at 11/14/17 2000  •  clonazePAM  (KlonoPIN) tablet 0.25 mg, 0.25 mg, Oral, BID, aFisal Jones MD  •  [START ON 11/18/2017] clonazePAM (KlonoPIN) tablet 0.25 mg, 0.25 mg, Oral, Daily, Faisal Jones MD  •  cyclobenzaprine (FLEXERIL) tablet 10 mg, 10 mg, Oral, Daily, Magali Cooper MD, 10 mg at 11/15/17 0824  •  divalproex (DEPAKOTE) DR tablet 250 mg, 250 mg, Oral, Daily, Magali Cooper MD, 250 mg at 11/15/17 0823  •  divalproex (DEPAKOTE) DR tablet 500 mg, 500 mg, Oral, Nightly, Magali Cooper MD, 500 mg at 11/14/17 2001  •  DULoxetine (CYMBALTA) DR capsule 60 mg, 60 mg, Oral, Daily, Magali Cooper MD, 60 mg at 11/15/17 0823  •  fenofibrate (TRICOR) tablet 145 mg, 145 mg, Oral, Daily, Magali Cooper MD, 145 mg at 11/15/17 0823  •  fluticasone (FLONASE) 50 MCG/ACT nasal spray 2 spray, 2 spray, Each Nare, Daily, Magali Cooper MD, 2 spray at 11/15/17 0824  •  gabapentin (NEURONTIN) capsule 600 mg, 600 mg, Oral, Q8H, Magali Cooper MD, 600 mg at 11/15/17 0541  •  hydrOXYzine (VISTARIL) capsule 50 mg, 50 mg, Oral, Q6H PRN, Faisal Jones MD, 50 mg at 11/14/17 1630  •  ipratropium-albuterol (DUO-NEB) nebulizer solution 3 mL, 3 mL, Nebulization, 4x Daily - RT, Magali Cooper MD, 3 mL at 11/15/17 0730  •  levothyroxine (SYNTHROID, LEVOTHROID) tablet 100 mcg, 100 mcg, Oral, Q AM, Magali Cooper MD, 100 mcg at 11/15/17 0541  •  lidocaine (LIDODERM) 5 % 1 patch, 1 patch, Transdermal, Q24H, Magali Cooper MD, 1 patch at 11/15/17 0820  •  loperamide (IMODIUM) capsule 2 mg, 2 mg, Oral, 4x Daily PRN, Faisal Jones MD  •  magnesium hydroxide (MILK OF MAGNESIA) suspension 2400 mg/10mL 10 mL, 10 mL, Oral, Daily PRN, Faisal Jones MD  •  nicotine (NICODERM CQ) 14 MG/24HR patch 1 patch, 1 patch, Transdermal, Q24H, Faisal Jones MD, 1 patch at 11/15/17 0820  •  risperiDONE (risperDAL) tablet 1 mg, 1 mg, Oral, Q12H, Faisal Jones MD, 1 mg at 11/15/17 0823  •  traMADol (ULTRAM) tablet 50 mg, 50 mg, Oral, Q6H PRN, Magali Cooper,  MD, 50 mg at 11/15/17 0541  •  traZODone (DESYREL) tablet 50 mg, 50 mg, Oral, Nightly PRN, Faisal Jones MD, 50 mg at 11/14/17 2001  •  triamcinolone (KENALOG) 0.1 % cream, , Topical, Q12H, Alhaji Elmore MD, 1 application at 11/14/17 2883    Diagnoses/Assessment:   Principal Problem:    Major depressive disorder, recurrent, severe with psychotic features  Active Problems:    Bilateral deafness      Treatment Plan:    1) Will continue care for the patient on the behavioral health unit at [facility] to ensure patient safety.  2) Will continue to provide treatment with the unit milieu, activities, therapies and psychopharmacological management.  3) Patient to be placed on or continued on  Q15 minute checks  and Elopement and Fall precautions.  4) Will continue medical management by Dr. Elmore.  5) Will order following labs: none  6) Will make the following medication changes: Will decrease the depakote to 250mg bid based on LFT increase.  Will continue the klonopin taper.  Will cont cymbalta and risperdal.  Increased levoxyl to 112mcg due to elevated TSH.  7) Will continue discharge planning as appropriate for patient.  8) Psychotherapy provided: none    Treatment plan and medication risks and benefits discussed with: Treatment Team    Faisal Jones MD  11/15/17  11:13 AM

## 2017-11-16 PROCEDURE — 99232 SBSQ HOSP IP/OBS MODERATE 35: CPT | Performed by: PSYCHIATRY & NEUROLOGY

## 2017-11-16 RX ORDER — DIVALPROEX SODIUM 250 MG/1
250 TABLET, DELAYED RELEASE ORAL NIGHTLY
Status: COMPLETED | OUTPATIENT
Start: 2017-11-16 | End: 2017-11-17

## 2017-11-16 RX ADMIN — TRIAMCINOLONE ACETONIDE: 1 CREAM TOPICAL at 08:19

## 2017-11-16 RX ADMIN — ACETAMINOPHEN 650 MG: 325 TABLET ORAL at 03:12

## 2017-11-16 RX ADMIN — FENOFIBRATE 145 MG: 145 TABLET ORAL at 08:18

## 2017-11-16 RX ADMIN — DIVALPROEX SODIUM 250 MG: 250 TABLET, DELAYED RELEASE ORAL at 08:18

## 2017-11-16 RX ADMIN — GABAPENTIN 600 MG: 300 CAPSULE ORAL at 23:53

## 2017-11-16 RX ADMIN — ACETAMINOPHEN 650 MG: 325 TABLET ORAL at 18:37

## 2017-11-16 RX ADMIN — FLUTICASONE PROPIONATE 2 SPRAY: 50 SPRAY, METERED NASAL at 08:19

## 2017-11-16 RX ADMIN — TRAMADOL HYDROCHLORIDE 50 MG: 50 TABLET, FILM COATED ORAL at 14:29

## 2017-11-16 RX ADMIN — HYDROXYZINE PAMOATE 50 MG: 50 CAPSULE ORAL at 23:54

## 2017-11-16 RX ADMIN — ACETAMINOPHEN 650 MG: 325 TABLET ORAL at 11:34

## 2017-11-16 RX ADMIN — GABAPENTIN 600 MG: 300 CAPSULE ORAL at 14:11

## 2017-11-16 RX ADMIN — DIVALPROEX SODIUM 250 MG: 250 TABLET, DELAYED RELEASE ORAL at 20:01

## 2017-11-16 RX ADMIN — LIDOCAINE 1 PATCH: 50 PATCH CUTANEOUS at 08:40

## 2017-11-16 RX ADMIN — DULOXETINE 60 MG: 60 CAPSULE, DELAYED RELEASE ORAL at 08:18

## 2017-11-16 RX ADMIN — HYDROXYZINE PAMOATE 50 MG: 50 CAPSULE ORAL at 11:38

## 2017-11-16 RX ADMIN — CLONAZEPAM 0.25 MG: 0.5 TABLET ORAL at 08:18

## 2017-11-16 RX ADMIN — LEVOTHYROXINE SODIUM 112 MCG: 112 TABLET ORAL at 05:42

## 2017-11-16 RX ADMIN — CYCLOBENZAPRINE HYDROCHLORIDE 10 MG: 10 TABLET, FILM COATED ORAL at 08:18

## 2017-11-16 RX ADMIN — TRAZODONE HYDROCHLORIDE 50 MG: 50 TABLET ORAL at 20:01

## 2017-11-16 RX ADMIN — NICOTINE 1 PATCH: 14 PATCH, EXTENDED RELEASE TRANSDERMAL at 08:57

## 2017-11-16 RX ADMIN — RISPERIDONE 1 MG: 1 TABLET ORAL at 08:18

## 2017-11-16 RX ADMIN — CLONAZEPAM 0.25 MG: 0.5 TABLET ORAL at 17:18

## 2017-11-16 RX ADMIN — RISPERIDONE 1.5 MG: 1 TABLET ORAL at 20:00

## 2017-11-16 RX ADMIN — ATORVASTATIN CALCIUM 40 MG: 40 TABLET, FILM COATED ORAL at 20:00

## 2017-11-16 RX ADMIN — GABAPENTIN 600 MG: 300 CAPSULE ORAL at 05:42

## 2017-11-16 NOTE — NURSING NOTE
Woke up around 3:00am.  Has received prn Ultram and Tylenol throughout the night, c/o back pain.  Walking around in hallway, asking for coffee.

## 2017-11-16 NOTE — NURSING NOTE
"Behavior   Anxiety 0  Depression 0  Pain 3  AVH no  S/I no  H/I no  Affect calm and pleasant    Pt is sitting in the dayroom area of [facility] [employer] at this time; rates chronic back  Pain 3/10 at this time; no other needs verbalized; \"Is he going to let me go home?\" Pt asked that several times during nursing assessment.  \"I can't believe I'm 62 years old.\"  Wasn't tearful during our interaction.  Will continue to monitor.    Intervention  Medications reviewed and administered  Assessment complete    Response  Verbalized understanding   Took medications  Interacted with assessment    Plan  Will promote and reinforce current treatment plan and encourage involvement in care plan goals.   Will provide for safe, calm, quiet environment.  Will promote open communication with staff and foster a trusting/working relationship with patient.   Will promote participation in groups and therapies and independent reflection.      "

## 2017-11-16 NOTE — NURSING NOTE
Behavior   Anxiety 0  Depression 0  Pain 5  AVH no  S/I no  H/I no  Affect calm and pleasant    Patient assisted out of bed.  Ambulated in [place] with walker to [facility] day area for snack. Communication via writing.  Has not attempted to get out of any doors or asked about going home.  Calm and cooperative.  C/o pain in lower back, prn Ultram given with HS meds.  Med compliant.  No needs voiced at this time.      Intervention  Medications reviewed and administered  Assessment complete    Response  Verbalized understanding   Took medications  Interacted with assessment    Plan  Will promote and reinforce current treatment plan and encourage involvement in care plan goals.   Will provide for safe, calm, quiet environment.  Will promote open communication with staff and foster a trusting/working relationship with patient.   Will promote participation in groups and therapies and independent reflection.

## 2017-11-16 NOTE — CONSULTS
"Adult Nutrition  Assessment    Patient Name:  Ghazala Lawson  YOB: 1955  MRN: [id number]  Admit Date:  11/10/2017    Assessment Date:  11/16/2017    Comments: Communicated with pt via notes as pt is deaf. Pt indicates good appetite.  Gave no food preferences.  Indicates she weighed 95 lb 4 year ago.  Intake 100% - 3x, 75% - 3x, 50% - 1x, 0% - 1x.  Meds and labs reviewed.  Pt seen per length of stay.          Reason for Assessment       11/16/17 1050    Reason for Assessment    Reason For Assessment/Visit length of stay;per organizational policy                  Anthropometrics       11/16/17 1051    Anthropometrics    Height 154.9 cm (60.98\")    Weight 64.3 kg (141 lb 12.1 oz)    Ideal Body Weight (IBW)    Ideal Body Weight (IBW), Female 48.51    % Ideal Body Weight 132.83    Body Mass Index (BMI)    BMI (kg/m2) 26.85    BMI Grade 25 - 29.9 - overweight            Labs/Tests/Procedures/Meds       11/16/17 1053    Labs/Tests/Procedures/Meds    Labs/Tests Review Other (comment)   HDL              Estimated/Assessed Needs       11/16/17 1053    Calculation Measurements    Weight Used For Calculations 51.8 kg (114 lb 3.2 oz)    Height Used for Calculations 1.549 m (5' 0.98\")    Estimated/Assessed Energy Needs    Energy Need Method [place] Jeor    Age 62    RMR ([place] Jeor Equation) 1020.12    Activity Factors ([place] St Jeor)  Out of bed, ambulatory  1.3    Total estimated needs ([place] St[place] Jeor) 1326    Estimated/Assessed Protein Needs    Weight Used for Protein Calculation 51.8 kg (114 lb 3.2 oz)    Protein (gm/kg) 1.2    1.2 Gm Protein (gm) 62.16    Estimated Protein Range 51 - 62    Estimated/Assessed Fluid Needs    Fluid Need Method --   1554            Nutrition Prescription Ordered       11/16/17 1056    Nutrition Prescription PO    Current PO Diet Soft Texture    Texture Ground    Fluid Consistency Thin            Evaluation of Received Nutrient/Fluid Intake       11/16/17 1057 "    PO Evaluation    Number of Meals 8    % PO Intake 100% - 3x, 75% - 3x, 50% - 1x, 0% - 1x            Electronically signed by:  Hellen Cameron RD  11/16/17 10:59 AM

## 2017-11-16 NOTE — PLAN OF CARE
Problem: Patient Care Overview (Adult)  Goal: Plan of Care Review  Outcome: Ongoing (interventions implemented as appropriate)    11/15/17 2351   Coping/Psychosocial Response Interventions   Plan Of Care Reviewed With patient       Goal: Adult Individualization and Mutuality  Outcome: Ongoing (interventions implemented as appropriate)  Goal: Discharge Needs Assessment  Outcome: Ongoing (interventions implemented as appropriate)    Problem: BH Patient Care Overview (Adult)  Goal: Plan of Care Review  Outcome: Ongoing (interventions implemented as appropriate)  Goal: Interdisciplinary Rounds/Family Conference  Outcome: Ongoing (interventions implemented as appropriate)  Goal: Individualization and Mutuality  Outcome: Ongoing (interventions implemented as appropriate)  Goal: Discharge Needs Assessment  Outcome: Ongoing (interventions implemented as appropriate)    Problem: Hearing Impairment/Deaf (Adult,Obstetrics,Pediatric)  Goal: Identify Related Risk Factors and Signs and Symptoms  Outcome: Ongoing (interventions implemented as appropriate)  Goal: Enhanced Communication  Outcome: Outcome(s) achieved Date Met:  11/15/17    Problem: Fall Risk (Adult)  Goal: Identify Related Risk Factors and Signs and Symptoms  Outcome: Ongoing (interventions implemented as appropriate)  Goal: Absence of Falls  Outcome: Ongoing (interventions implemented as appropriate)

## 2017-11-16 NOTE — PLAN OF CARE
Problem: Patient Care Overview (Adult)  Goal: Plan of Care Review  Outcome: Ongoing (interventions implemented as appropriate)  Encourage intake.    11/16/17 1104   Coping/Psychosocial Response Interventions   Plan Of Care Reviewed With patient   Patient Care Overview   Progress no change   Outcome Evaluation   Outcome Summary/Follow up Plan initial assessment

## 2017-11-16 NOTE — PLAN OF CARE
Problem: BH Overarching Goals  Goal: Adheres to Safety Considerations for Self and Others  Outcome: Ongoing (interventions implemented as appropriate)  Goal: Optimized Coping Skills in Response to Life Stressors  Outcome: Ongoing (interventions implemented as appropriate)  Goal: Develops/Participates in Therapeutic [place] to Support Successful Transition  Outcome: Ongoing (interventions implemented as appropriate)    Problem: Alteration in Thoughts and Perception  Goal: Verbalize thoughts and feelings associated with:  Outcome: Ongoing (interventions implemented as appropriate)  Goal: Refrain from acting on delusional thinking/internal stimuli  Outcome: Ongoing (interventions implemented as appropriate)  Goal: Agree to be compliant with medication regime, as prescribed and report medication side effects  Outcome: Ongoing (interventions implemented as appropriate)  Goal: Attend and participate in unit activities, including therapeutic, recreational, and educational groups  Outcome: Ongoing (interventions implemented as appropriate)  Goal: Complete daily ADLs, including personal hygiene independently, as able  Outcome: Ongoing (interventions implemented as appropriate)    Problem: Hearing Impairment/Deaf (Adult,Obstetrics,Pediatric)  Goal: Identify Related Risk Factors and Signs and Symptoms  Outcome: Ongoing (interventions implemented as appropriate)    Problem: Fall Risk (Adult)  Goal: Identify Related Risk Factors and Signs and Symptoms  Outcome: Ongoing (interventions implemented as appropriate)  Goal: Absence of Falls  Outcome: Ongoing (interventions implemented as appropriate)

## 2017-11-16 NOTE — PROGRESS NOTES
11/16/2017    Chief Complaint: psychosis    Subjective:  Patient is a 62 y.o. female who was hospitalized for psychosis.   Since yesterday the patient has continued to be delusional about her nephew waiting for her in the parking lot.  She continues to ask if she can go home.  Communications with her is via writing by me and verbal responses by her.  She appears to be tolerating the medication changes without difficulty.    Objective     Vital Signs    Temp:  [97 °F (36.1 °C)-98.1 °F (36.7 °C)] 98.1 °F (36.7 °C)  Heart Rate:  [84-97] 97  Resp:  [18] 18  BP: (108-118)/(54-68) 108/54    Physical Exam:   General Appearance: alert, appears stated age and cooperative,  Hygiene:   good  Gait & Station: Normal  Musculoskeletal: No tremors or abnormal involuntary movements    Mental Status Exam:   Cooperation:  Cooperative  Eye Contact:  Fair  Psychomotor Behavior:  Restless  Mood: Anxious/Nervous  Affect:  constricted  Speech:  slow  Thought Process:  Thought blocking  Associations: Goal Directed  Thought Content:     delusional   Suicidal:  None   Homicidal:  None   Hallucinations:  Not demonstrated today    Delusion:  Delusional as noted above  Cognitive Functioning:   Consciousness: awake, alert and confused  Reliability:  poor  Insight:  Poor  Judgement:  Poor  Impulse Control:  Poor    Lab Results (last 24 hours)     ** No results found for the last 24 hours. **        Imaging Results (last 24 hours)     ** No results found for the last 24 hours. **          Medicine:   Current Facility-Administered Medications:   •  acetaminophen (TYLENOL) tablet 650 mg, 650 mg, Oral, Q4H PRN, Faisal Jones MD, 650 mg at 11/16/17 1134  •  aluminum-magnesium hydroxide-simethicone (MAALOX MAX) 400-400-40 MG/5ML suspension 15 mL, 15 mL, Oral, Q6H PRN, Faisal Jones MD, 15 mL at 11/14/17 2308  •  atorvastatin (LIPITOR) tablet 40 mg, 40 mg, Oral, Nightly, Magali Cooper MD, 40 mg at 11/15/17 2012  •  clonazePAM (KlonoPIN)  tablet 0.25 mg, 0.25 mg, Oral, BID, Faisal Jones MD, 0.25 mg at 11/16/17 0818  •  [START ON 11/18/2017] clonazePAM (KlonoPIN) tablet 0.25 mg, 0.25 mg, Oral, Daily, Faisal Jones MD  •  cyclobenzaprine (FLEXERIL) tablet 10 mg, 10 mg, Oral, Daily, Magali Cooper MD, 10 mg at 11/16/17 0818  •  divalproex (DEPAKOTE) DR tablet 250 mg, 250 mg, Oral, Q12H, Faisal Jones MD, 250 mg at 11/16/17 0818  •  DULoxetine (CYMBALTA) DR capsule 60 mg, 60 mg, Oral, Daily, Magali Cooper MD, 60 mg at 11/16/17 0818  •  fenofibrate (TRICOR) tablet 145 mg, 145 mg, Oral, Daily, Magali Cooper MD, 145 mg at 11/16/17 0818  •  fluticasone (FLONASE) 50 MCG/ACT nasal spray 2 spray, 2 spray, Each Nare, Daily, Magali Cooper MD, 2 spray at 11/16/17 0819  •  gabapentin (NEURONTIN) capsule 600 mg, 600 mg, Oral, Q8H, Magali Cooper MD, 600 mg at 11/16/17 0542  •  hydrOXYzine (VISTARIL) capsule 50 mg, 50 mg, Oral, Q6H PRN, Faisal Jones MD, 50 mg at 11/16/17 1138  •  ipratropium-albuterol (DUO-NEB) nebulizer solution 3 mL, 3 mL, Nebulization, 4x Daily - RT, Magali Cooper MD, 3 mL at 11/15/17 1110  •  levothyroxine (SYNTHROID, LEVOTHROID) tablet 112 mcg, 112 mcg, Oral, Q AM, Faisal Jones MD, 112 mcg at 11/16/17 0542  •  lidocaine (LIDODERM) 5 % 1 patch, 1 patch, Transdermal, Q24H, Magali Cooper MD, 1 patch at 11/16/17 0840  •  loperamide (IMODIUM) capsule 2 mg, 2 mg, Oral, 4x Daily PRN, Faisal Jones MD  •  magnesium hydroxide (MILK OF MAGNESIA) suspension 2400 mg/10mL 10 mL, 10 mL, Oral, Daily PRN, Faisal Jones MD  •  nicotine (NICODERM CQ) 14 MG/24HR patch 1 patch, 1 patch, Transdermal, Q24H, Faisal Jones MD, 1 patch at 11/16/17 0857  •  risperiDONE (risperDAL) tablet 1 mg, 1 mg, Oral, Q12H, Faisal Jones MD, 1 mg at 11/16/17 0818  •  traMADol (ULTRAM) tablet 50 mg, 50 mg, Oral, Q6H PRN, Magali Cooper MD, 50 mg at 11/15/17 2059  •  traZODone (DESYREL) tablet 50 mg, 50 mg, Oral, Nightly PRN,  Faisal Jones MD, 50 mg at 11/15/17 2012  •  triamcinolone (KENALOG) 0.1 % cream, , Topical, Q12H, Alhaji Elmore MD    Diagnoses/Assessment:   Principal Problem:    Major depressive disorder, recurrent, severe with psychotic features  Active Problems:    Bilateral deafness      Treatment Plan:    1) Will continue care for the patient on the behavioral health unit at [facility] to ensure patient safety.  2) Will continue to provide treatment with the unit milieu, activities, therapies and psychopharmacological management.  3) Patient to be placed on or continued on  Q15 minute checks  and Elopement and Fall precautions.  4) Will continue medical management by Dr. Elmore.  5) Will order following labs: none  6) Will make the following medication changes: Will decrease the depakote to 250mg per day.  Will continue the klonopin taper.  Will continue the cymbalta and levoxyl.  Will increase the risperdal to 3mg/day.  7) Will continue discharge planning as appropriate for patient.  8) Psychotherapy provided: none    Treatment plan and medication risks and benefits discussed with: Treatment Team    Faisal Jones MD  11/16/17  1:33 PM

## 2017-11-17 PROCEDURE — 94760 N-INVAS EAR/PLS OXIMETRY 1: CPT

## 2017-11-17 PROCEDURE — 99232 SBSQ HOSP IP/OBS MODERATE 35: CPT | Performed by: PSYCHIATRY & NEUROLOGY

## 2017-11-17 PROCEDURE — 94799 UNLISTED PULMONARY SVC/PX: CPT

## 2017-11-17 RX ADMIN — GABAPENTIN 600 MG: 300 CAPSULE ORAL at 20:42

## 2017-11-17 RX ADMIN — IPRATROPIUM BROMIDE AND ALBUTEROL SULFATE 3 ML: 2.5; .5 SOLUTION RESPIRATORY (INHALATION) at 16:13

## 2017-11-17 RX ADMIN — DULOXETINE 60 MG: 60 CAPSULE, DELAYED RELEASE ORAL at 08:03

## 2017-11-17 RX ADMIN — TRIAMCINOLONE ACETONIDE 1 APPLICATION: 1 CREAM TOPICAL at 08:05

## 2017-11-17 RX ADMIN — ATORVASTATIN CALCIUM 40 MG: 40 TABLET, FILM COATED ORAL at 20:42

## 2017-11-17 RX ADMIN — GABAPENTIN 600 MG: 300 CAPSULE ORAL at 06:10

## 2017-11-17 RX ADMIN — CLONAZEPAM 0.25 MG: 0.5 TABLET ORAL at 08:04

## 2017-11-17 RX ADMIN — TRAMADOL HYDROCHLORIDE 50 MG: 50 TABLET, FILM COATED ORAL at 17:43

## 2017-11-17 RX ADMIN — HYDROXYZINE PAMOATE 50 MG: 50 CAPSULE ORAL at 17:45

## 2017-11-17 RX ADMIN — IPRATROPIUM BROMIDE AND ALBUTEROL SULFATE 3 ML: 2.5; .5 SOLUTION RESPIRATORY (INHALATION) at 06:55

## 2017-11-17 RX ADMIN — RISPERIDONE 1.5 MG: 1 TABLET ORAL at 20:49

## 2017-11-17 RX ADMIN — FLUTICASONE PROPIONATE 2 SPRAY: 50 SPRAY, METERED NASAL at 08:02

## 2017-11-17 RX ADMIN — FENOFIBRATE 145 MG: 145 TABLET ORAL at 08:02

## 2017-11-17 RX ADMIN — GABAPENTIN 600 MG: 300 CAPSULE ORAL at 21:00

## 2017-11-17 RX ADMIN — TRAMADOL HYDROCHLORIDE 50 MG: 50 TABLET, FILM COATED ORAL at 02:42

## 2017-11-17 RX ADMIN — TRAZODONE HYDROCHLORIDE 50 MG: 50 TABLET ORAL at 20:43

## 2017-11-17 RX ADMIN — LEVOTHYROXINE SODIUM 112 MCG: 112 TABLET ORAL at 06:10

## 2017-11-17 RX ADMIN — RISPERIDONE 1.5 MG: 1 TABLET ORAL at 08:03

## 2017-11-17 RX ADMIN — CYCLOBENZAPRINE HYDROCHLORIDE 10 MG: 10 TABLET, FILM COATED ORAL at 08:03

## 2017-11-17 RX ADMIN — DIVALPROEX SODIUM 250 MG: 250 TABLET, DELAYED RELEASE ORAL at 20:42

## 2017-11-17 RX ADMIN — LIDOCAINE 1 PATCH: 50 PATCH CUTANEOUS at 08:02

## 2017-11-17 NOTE — NURSING NOTE
Pt. Refused her two o'clock medication of neurotin. She has been sitting in the recliner resting for some time now. I wrote down to her the importance of medications and why she needed it but she refused. Another nurse tried back later and she spit them out in refusal. She is not yelling or showing signs of hallucinations at this time. She is not showing signs of pain at this time. She is sitting quietly. Her affect is flat. And she watches others doing group.

## 2017-11-17 NOTE — NURSING NOTE
"Behavior   Anxiety 0  Depression 0  Pain 0  AVH 0  S/I no  H/I no  Affect calm and pleasant    Patient sitting in dayroom.  No complaints of pain at this time. Continues to communicate by writing notes. Med compliant.  States \"I am ready to go home\" several times throughout interview and asks staff what time is it frequently.  Patient has been wandering in other patients rooms stating that she is looking for a cat.  Appears to be having conversations with herself and responding to unidentified stimulus.          Intervention  Medications reviewed and administered  Assessment complete    Response  Verbalized understanding   Took medications  Interacted with assessment    Plan  Will promote and reinforce current treatment plan and encourage involvement in care plan goals.   Will provide for safe, calm, quiet environment.  Will promote open communication with staff and foster a trusting/working relationship with patient.   Will promote participation in groups and therapies and independent reflection.      "

## 2017-11-17 NOTE — NURSING NOTE
Pt. Is tearful and upset. She wanted to go thru the door and we would not let her. So she cried. Pt. Was talked to and she calmed down. She has been calm ever since. She was only tearful for less than 5 minutes.

## 2017-11-17 NOTE — NURSING NOTE
"Pt has not slept much at all tonight. Pt has been sitting in the common area stating \"here rena\", \"rena cat!?\" and meowing. Pt appears to be responding to internal stimuli displayed by reaching and calling for a cat. Pt has also been wandering the halls while talking out loud about irrelevant content.   "

## 2017-11-17 NOTE — PROGRESS NOTES
11/17/2017    Chief Complaint: psychosis    Subjective:  Patient is a 62 y.o. female who was hospitalized for psychosis.   Since yesterday the patient continues to hear her nephew and thinks that he is waiting for her.  She has had no behaviors here.  She does not report any issues with her med changes.      Objective     Vital Signs    Temp:  [97.2 °F (36.2 °C)-98 °F (36.7 °C)] 97.2 °F (36.2 °C)  Heart Rate:  [] 100  Resp:  [16-18] 16  BP: (117-127)/(62-76) 127/76    Physical Exam:   General Appearance: alert, appears stated age and cooperative,  Hygiene:   good  Gait & Station: Normal  Musculoskeletal: No tremors or abnormal involuntary movements    Mental Status Exam:   Cooperation:  Cooperative  Eye Contact:  Fair  Psychomotor Behavior:  Appropriate  Mood: Improving  Affect:  constricted  Speech:  less slow  Thought Process:  Less Thought blocking  Associations: Goal Directed  Thought Content:     delusional   Suicidal:  None   Homicidal:  None   Hallucinations:  Auditory   Delusion:  Delusional as noted above  Cognitive Functioning:   Consciousness: awake, alert and confused  Reliability:  poor  Insight:  Poor  Judgement:  Poor  Impulse Control:  Fair    Lab Results (last 24 hours)     ** No results found for the last 24 hours. **        Imaging Results (last 24 hours)     ** No results found for the last 24 hours. **          Medicine:   Current Facility-Administered Medications:   •  acetaminophen (TYLENOL) tablet 650 mg, 650 mg, Oral, Q4H PRN, Faisal Jones MD, 650 mg at 11/16/17 1837  •  aluminum-magnesium hydroxide-simethicone (MAALOX MAX) 400-400-40 MG/5ML suspension 15 mL, 15 mL, Oral, Q6H PRN, Faisal Jones MD, 15 mL at 11/14/17 2309  •  atorvastatin (LIPITOR) tablet 40 mg, 40 mg, Oral, Nightly, Magali Cooper MD, 40 mg at 11/16/17 2000  •  [START ON 11/18/2017] clonazePAM (KlonoPIN) tablet 0.25 mg, 0.25 mg, Oral, Daily, Faisal Jones MD  •  cyclobenzaprine (FLEXERIL) tablet 10 mg,  10 mg, Oral, Daily, Magali Cooper MD, 10 mg at 11/17/17 0803  •  divalproex (DEPAKOTE) DR tablet 250 mg, 250 mg, Oral, Nightly, Faisal Jones MD, 250 mg at 11/16/17 2001  •  DULoxetine (CYMBALTA) DR capsule 60 mg, 60 mg, Oral, Daily, Magali Cooper MD, 60 mg at 11/17/17 0803  •  fenofibrate (TRICOR) tablet 145 mg, 145 mg, Oral, Daily, Magali Cooper MD, 145 mg at 11/17/17 0802  •  fluticasone (FLONASE) 50 MCG/ACT nasal spray 2 spray, 2 spray, Each Nare, Daily, Magali Cooper MD, 2 spray at 11/17/17 0802  •  gabapentin (NEURONTIN) capsule 600 mg, 600 mg, Oral, Q8H, Magali Cooper MD, 600 mg at 11/17/17 0610  •  hydrOXYzine (VISTARIL) capsule 50 mg, 50 mg, Oral, Q6H PRN, Faisal Jones MD, 50 mg at 11/16/17 2354  •  ipratropium-albuterol (DUO-NEB) nebulizer solution 3 mL, 3 mL, Nebulization, 4x Daily - RT, Magali Cooper MD, 3 mL at 11/17/17 1613  •  levothyroxine (SYNTHROID, LEVOTHROID) tablet 112 mcg, 112 mcg, Oral, Q AM, Faisal Jones MD, 112 mcg at 11/17/17 0610  •  lidocaine (LIDODERM) 5 % 1 patch, 1 patch, Transdermal, Q24H, Magali Cooper MD, 1 patch at 11/17/17 0802  •  loperamide (IMODIUM) capsule 2 mg, 2 mg, Oral, 4x Daily PRN, Faisal Jones MD  •  magnesium hydroxide (MILK OF MAGNESIA) suspension 2400 mg/10mL 10 mL, 10 mL, Oral, Daily PRN, Faisal Jones MD  •  nicotine (NICODERM CQ) 14 MG/24HR patch 1 patch, 1 patch, Transdermal, Q24H, Faisal Jones MD, 1 patch at 11/16/17 0857  •  risperiDONE (risperDAL) tablet 1.5 mg, 1.5 mg, Oral, Q12H, Faisal Jones MD, 1.5 mg at 11/17/17 0803  •  traMADol (ULTRAM) tablet 50 mg, 50 mg, Oral, Q6H PRN, Magali Cooper MD, 50 mg at 11/17/17 0242  •  traZODone (DESYREL) tablet 50 mg, 50 mg, Oral, Nightly PRN, Faisal Jones MD, 50 mg at 11/16/17 2001  •  triamcinolone (KENALOG) 0.1 % cream, , Topical, Q12H, Alhaji Elmore MD, 1 application at 11/17/17 0805    Diagnoses/Assessment:   Principal Problem:    Major depressive  disorder, recurrent, severe with psychotic features  Active Problems:    Bilateral deafness      Treatment Plan:    1) Will continue care for the patient on the behavioral health unit at [facility] to ensure patient safety.  2) Will continue to provide treatment with the unit milieu, activities, therapies and psychopharmacological management.  3) Patient to be placed on or continued on  Q15 minute checks  and Elopement and Fall precautions.  4) Will continue medical management by Dr. Elmore.  5) Will order following labs: none  6) Will make the following medication changes: Will stop the depakote today.  Will continue the klonopin taper.  Will continue the cymbalta and risperdal.  7) Will continue discharge planning as appropriate for patient.  8) Psychotherapy provided: none    Treatment plan and medication risks and benefits discussed with: Patient    Faisal Jones MD  11/17/17  5:08 PM   11/17/2017

## 2017-11-17 NOTE — PLAN OF CARE
Problem: Patient Care Overview (Adult)  Goal: Plan of Care Review  Outcome: Ongoing (interventions implemented as appropriate)  Goal: Adult Individualization and Mutuality  Outcome: Ongoing (interventions implemented as appropriate)  Goal: Discharge Needs Assessment  Outcome: Ongoing (interventions implemented as appropriate)    Problem: BH Patient Care Overview (Adult)  Goal: Plan of Care Review  Outcome: Ongoing (interventions implemented as appropriate)  Goal: Interdisciplinary Rounds/Family Conference  Outcome: Ongoing (interventions implemented as appropriate)  Goal: Individualization and Mutuality  Outcome: Ongoing (interventions implemented as appropriate)  Goal: Discharge Needs Assessment  Outcome: Ongoing (interventions implemented as appropriate)    Problem:  Overarching Goals  Goal: Adheres to Safety Considerations for Self and Others  Outcome: Ongoing (interventions implemented as appropriate)  Goal: Optimized Coping Skills in Response to Life Stressors  Outcome: Ongoing (interventions implemented as appropriate)  Goal: Develops/Participates in Therapeutic [place] to Support Successful Transition  Outcome: Ongoing (interventions implemented as appropriate)    Problem: Hearing Impairment/Deaf (Adult,Obstetrics,Pediatric)  Goal: Identify Related Risk Factors and Signs and Symptoms  Outcome: Ongoing (interventions implemented as appropriate)    Problem: Fall Risk (Adult)  Goal: Identify Related Risk Factors and Signs and Symptoms  Outcome: Ongoing (interventions implemented as appropriate)  Goal: Absence of Falls  Outcome: Ongoing (interventions implemented as appropriate)

## 2017-11-17 NOTE — NURSING NOTE
Behavior   Anxiety pt stated no.  Depression no pt stated  Pain no pt stated  AVH no pt stated  S/I no  H/I no  Affect anxious    Pt. Was asked if she had any concerns she stated no. She was anxious and was stating she wanted to go home. She often watched the door this morning, but has not gotten near it any. She has been compliant with medications. She is attending group at this time. She has not been negative toward staff since her medications. But this morning she was upset with the tech and threw her walker on the group. She did not hit anyone with it. She calmed down and then took her medications after she got upset and she has been calm ever since. She is playing cards now. She does not hear well and reads good thou and is communicative that way. She states her needs.  Intervention  Instructed in medication usage and effects  Medications administered as ordered  Encouraged to verbalize needs      Response  Verbalized understanding   Did patient take medications as ordered yes   Did patient interact with assessment?  yes     Plan  Will monitor for safety  Will monitor every 15 minutes as ordered  Will evaluate and promote the plan of care

## 2017-11-17 NOTE — PLAN OF CARE
Problem: BH Overarching Goals  Goal: Adheres to Safety Considerations for Self and Others  Outcome: Ongoing (interventions implemented as appropriate)  Goal: Optimized Coping Skills in Response to Life Stressors  Outcome: Ongoing (interventions implemented as appropriate)  Goal: Develops/Participates in Therapeutic [place] to Support Successful Transition  Outcome: Ongoing (interventions implemented as appropriate)    Problem: Fall Risk (Adult)  Goal: Identify Related Risk Factors and Signs and Symptoms  Outcome: Ongoing (interventions implemented as appropriate)  Goal: Absence of Falls  Outcome: Ongoing (interventions implemented as appropriate)

## 2017-11-18 PROCEDURE — 94760 N-INVAS EAR/PLS OXIMETRY 1: CPT

## 2017-11-18 PROCEDURE — 99232 SBSQ HOSP IP/OBS MODERATE 35: CPT | Performed by: PSYCHIATRY & NEUROLOGY

## 2017-11-18 PROCEDURE — 94799 UNLISTED PULMONARY SVC/PX: CPT

## 2017-11-18 RX ORDER — RISPERIDONE 1 MG/1
2 TABLET ORAL NIGHTLY
Status: DISCONTINUED | OUTPATIENT
Start: 2017-11-18 | End: 2017-11-20 | Stop reason: HOSPADM

## 2017-11-18 RX ADMIN — TRAMADOL HYDROCHLORIDE 50 MG: 50 TABLET, FILM COATED ORAL at 08:36

## 2017-11-18 RX ADMIN — CYCLOBENZAPRINE HYDROCHLORIDE 10 MG: 10 TABLET, FILM COATED ORAL at 08:17

## 2017-11-18 RX ADMIN — TRAMADOL HYDROCHLORIDE 50 MG: 50 TABLET, FILM COATED ORAL at 21:04

## 2017-11-18 RX ADMIN — IPRATROPIUM BROMIDE AND ALBUTEROL SULFATE 3 ML: 2.5; .5 SOLUTION RESPIRATORY (INHALATION) at 15:28

## 2017-11-18 RX ADMIN — HYDROXYZINE PAMOATE 50 MG: 50 CAPSULE ORAL at 02:16

## 2017-11-18 RX ADMIN — TRAMADOL HYDROCHLORIDE 50 MG: 50 TABLET, FILM COATED ORAL at 02:16

## 2017-11-18 RX ADMIN — GABAPENTIN 600 MG: 300 CAPSULE ORAL at 14:00

## 2017-11-18 RX ADMIN — DULOXETINE 60 MG: 60 CAPSULE, DELAYED RELEASE ORAL at 08:36

## 2017-11-18 RX ADMIN — IPRATROPIUM BROMIDE AND ALBUTEROL SULFATE 3 ML: 2.5; .5 SOLUTION RESPIRATORY (INHALATION) at 19:38

## 2017-11-18 RX ADMIN — RISPERIDONE 1.5 MG: 1 TABLET ORAL at 08:16

## 2017-11-18 RX ADMIN — GABAPENTIN 600 MG: 300 CAPSULE ORAL at 21:04

## 2017-11-18 RX ADMIN — LEVOTHYROXINE SODIUM 112 MCG: 112 TABLET ORAL at 08:16

## 2017-11-18 RX ADMIN — LIDOCAINE 1 PATCH: 50 PATCH CUTANEOUS at 08:16

## 2017-11-18 RX ADMIN — IPRATROPIUM BROMIDE AND ALBUTEROL SULFATE 3 ML: 2.5; .5 SOLUTION RESPIRATORY (INHALATION) at 07:52

## 2017-11-18 RX ADMIN — FENOFIBRATE 145 MG: 145 TABLET ORAL at 08:16

## 2017-11-18 RX ADMIN — CLONAZEPAM 0.25 MG: 0.5 TABLET ORAL at 08:16

## 2017-11-18 RX ADMIN — IPRATROPIUM BROMIDE AND ALBUTEROL SULFATE 3 ML: 2.5; .5 SOLUTION RESPIRATORY (INHALATION) at 11:39

## 2017-11-18 RX ADMIN — RISPERIDONE 2 MG: 1 TABLET ORAL at 21:04

## 2017-11-18 RX ADMIN — TRAZODONE HYDROCHLORIDE 50 MG: 50 TABLET ORAL at 21:04

## 2017-11-18 RX ADMIN — ATORVASTATIN CALCIUM 40 MG: 40 TABLET, FILM COATED ORAL at 21:04

## 2017-11-18 RX ADMIN — FLUTICASONE PROPIONATE 2 SPRAY: 50 SPRAY, METERED NASAL at 08:15

## 2017-11-18 NOTE — PROGRESS NOTES
11/18/2017    Chief Complaint: psychosis    Subjective:  Patient is a 62 y.o. female who was hospitalized for psychosis.   Today patient seems less fixated on her nephew and on leaving but she seems more sedate than yesterday.  She is not trying to elope any further.  She appears to be tolerating the medication changes.    Objective     Vital Signs    Temp:  [98 °F (36.7 °C)-98.9 °F (37.2 °C)] 98 °F (36.7 °C)  Heart Rate:  [] 84  Resp:  [16-18] 16  BP: (117-126)/(67-73) 126/67    Physical Exam:   General Appearance: appears stated age and cooperative,  Hygiene:   good  Gait & Station: Normal  Musculoskeletal: No tremors or abnormal involuntary movements    Mental Status Exam:   Cooperation:  Cooperative  Eye Contact:  Fair  Psychomotor Behavior:  Slow  Mood: Improving  Affect:  constricted  Speech:  slow  Thought Process:  Incoherent at times possibly due to poor enunciation  Associations: Tangential  Thought Content:        Suicidal:  None   Homicidal:  None   Hallucinations:  Auditory   Delusion:  Continued delusion about nephew but less vocal about it.  Cognitive Functioning:   Consciousness: sedated and Orientation:  Person  Reliability:  poor  Insight:  Poor  Judgement:  Poor  Impulse Control:  Fair    Lab Results (last 24 hours)     ** No results found for the last 24 hours. **        Imaging Results (last 24 hours)     ** No results found for the last 24 hours. **          Medicine:   Current Facility-Administered Medications:   •  acetaminophen (TYLENOL) tablet 650 mg, 650 mg, Oral, Q4H PRN, Faisal Jones MD, 650 mg at 11/16/17 1837  •  aluminum-magnesium hydroxide-simethicone (MAALOX MAX) 400-400-40 MG/5ML suspension 15 mL, 15 mL, Oral, Q6H PRN, Faisal Jones MD, 15 mL at 11/14/17 2309  •  atorvastatin (LIPITOR) tablet 40 mg, 40 mg, Oral, Nightly, Magali Cooper MD, 40 mg at 11/17/17 2042  •  clonazePAM (KlonoPIN) tablet 0.25 mg, 0.25 mg, Oral, Daily, Faisal Jones MD, 0.25 mg at  11/18/17 0816  •  cyclobenzaprine (FLEXERIL) tablet 10 mg, 10 mg, Oral, Daily, Magali Cooper MD, 10 mg at 11/18/17 0817  •  DULoxetine (CYMBALTA) DR capsule 60 mg, 60 mg, Oral, Daily, Magali Cooper MD, 60 mg at 11/18/17 0836  •  fenofibrate (TRICOR) tablet 145 mg, 145 mg, Oral, Daily, Magali Cooper MD, 145 mg at 11/18/17 0816  •  fluticasone (FLONASE) 50 MCG/ACT nasal spray 2 spray, 2 spray, Each Nare, Daily, Magali Cooper MD, 2 spray at 11/18/17 0815  •  gabapentin (NEURONTIN) capsule 600 mg, 600 mg, Oral, Q8H, Magali Cooper MD, 600 mg at 11/17/17 2100  •  hydrOXYzine (VISTARIL) capsule 50 mg, 50 mg, Oral, Q6H PRN, Faisal Jones MD, 50 mg at 11/18/17 0216  •  ipratropium-albuterol (DUO-NEB) nebulizer solution 3 mL, 3 mL, Nebulization, 4x Daily - RT, Magali Cooper MD, 3 mL at 11/17/17 1613  •  levothyroxine (SYNTHROID, LEVOTHROID) tablet 112 mcg, 112 mcg, Oral, Q AM, Faisal Jones MD, 112 mcg at 11/18/17 0816  •  lidocaine (LIDODERM) 5 % 1 patch, 1 patch, Transdermal, Q24H, Magali Cooper MD, 1 patch at 11/18/17 0816  •  loperamide (IMODIUM) capsule 2 mg, 2 mg, Oral, 4x Daily PRN, Faisal Jones MD  •  magnesium hydroxide (MILK OF MAGNESIA) suspension 2400 mg/10mL 10 mL, 10 mL, Oral, Daily PRN, Faisal Jones MD  •  nicotine (NICODERM CQ) 14 MG/24HR patch 1 patch, 1 patch, Transdermal, Q24H, Faisal Jones MD, 1 patch at 11/16/17 0857  •  risperiDONE (risperDAL) tablet 1.5 mg, 1.5 mg, Oral, Q12H, Faisal Jones MD, 1.5 mg at 11/18/17 0816  •  traMADol (ULTRAM) tablet 50 mg, 50 mg, Oral, Q6H PRN, Magali Cooper MD, 50 mg at 11/18/17 0836  •  traZODone (DESYREL) tablet 50 mg, 50 mg, Oral, Nightly PRN, Faisal Jones MD, 50 mg at 11/17/17 2043  •  triamcinolone (KENALOG) 0.1 % cream, , Topical, Q12H, Alhaji Elmore MD, 1 application at 11/17/17 0805    Diagnoses/Assessment:   Principal Problem:    Major depressive disorder, recurrent, severe with psychotic features  Active  Problems:    Bilateral deafness      Treatment Plan:    1) Will continue care for the patient on the behavioral health unit at [facility] to ensure patient safety.  2) Will continue to provide treatment with the unit milieu, activities, therapies and psychopharmacological management.  3) Patient to be placed on or continued on  Q15 minute checks  and Elopement and Fall precautions.  4) Will continue medical management by Dr. Elmore.  5) Will order following labs: none  6) Will make the following medication changes: Will continue the klonopin taper.  Will continue the cymbalta.  Will change the Risperdal to 2mg qhs instead of 1.5mg bid due to sedation.  7) Will continue discharge planning as appropriate for patient.  8) Psychotherapy provided: none    Treatment plan and medication risks and benefits discussed with: Patient and Treatment Team    Faisal Jones MD  11/18/17  11:20 AM

## 2017-11-18 NOTE — PLAN OF CARE
Problem: BH Patient Care Overview (Adult)  Goal: Plan of Care Review  Outcome: Ongoing (interventions implemented as appropriate)  Goal: Interdisciplinary Rounds/Family Conference  Outcome: Ongoing (interventions implemented as appropriate)  Goal: Individualization and Mutuality  Outcome: Ongoing (interventions implemented as appropriate)  Goal: Discharge Needs Assessment  Outcome: Ongoing (interventions implemented as appropriate)    Problem:  Overarching Goals  Goal: Adheres to Safety Considerations for Self and Others  Outcome: Ongoing (interventions implemented as appropriate)  Goal: Optimized Coping Skills in Response to Life Stressors  Outcome: Ongoing (interventions implemented as appropriate)  Goal: Develops/Participates in Therapeutic [place] to Support Successful Transition  Outcome: Ongoing (interventions implemented as appropriate)    Problem: Hearing Impairment/Deaf (Adult,Obstetrics,Pediatric)  Goal: Identify Related Risk Factors and Signs and Symptoms  Outcome: Ongoing (interventions implemented as appropriate)    Problem: Fall Risk (Adult)  Goal: Identify Related Risk Factors and Signs and Symptoms  Outcome: Ongoing (interventions implemented as appropriate)  Goal: Absence of Falls  Outcome: Ongoing (interventions implemented as appropriate)

## 2017-11-18 NOTE — NURSING NOTE
Pt. Tries to get out and we watch the door and watch her activity often. She is not mean toward staff but is not redirected very easily. She asks questions about people in her life, but she does not say they are dead anymore she just wonders about them and how they are. She shows confusion in her questions. She is hard of hearing so she is communicating by us writing down things and she seems to be okay with that way of communication. She gets tempered when she cant get out the door, but she has not hit me this shift or any aggression toward me.

## 2017-11-18 NOTE — PLAN OF CARE
Problem: BH Patient Care Overview (Adult)  Goal: Individualization and Mutuality  Outcome: Ongoing (interventions implemented as appropriate)  Goal: Discharge Needs Assessment  Outcome: Ongoing (interventions implemented as appropriate)    Problem:  Overarching Goals  Goal: Adheres to Safety Considerations for Self and Others  Outcome: Ongoing (interventions implemented as appropriate)  Goal: Optimized Coping Skills in Response to Life Stressors  Outcome: Ongoing (interventions implemented as appropriate)  Goal: Develops/Participates in Therapeutic [place] to Support Successful Transition  Outcome: Ongoing (interventions implemented as appropriate)    Problem: Fall Risk (Adult)  Goal: Absence of Falls  Outcome: Ongoing (interventions implemented as appropriate)

## 2017-11-18 NOTE — NURSING NOTE
Behavior   Anxiety 0  Depression 0  Pain 6  AVH no  S/I no  H/I no  Affect labile    Note:  Patient continues to be intrusive with staff and peers.  She has to be reminded of boundaries.  She requires redirection.  Staff communicates with patient by writing down questions and she can verbally answer.  She has some difficulty in following instructions.  She denies A/V hallucinations but staff has observed her in floor calling her cat or looking for cat under bed.  She has been heard talking to unseen others.  She is argumentative with staff.  She is cooperative with taking meds and eating.        Intervention  Offered pm snack, gave hs meds.  Attempted to interview patient.  Instructed in medication usage and effects  Medications administered as ordered  Encouraged to verbalize needs      Response  Patient has difficult time understanding some interview questions, not sure how accurate answers are.  She ate snack, took hs meds.  She attempts to interact with peers but she can't hear them.  Verbalized understanding   Did patient take medications as ordered yes   Did patient interact with assessment?  yes     Plan  Will monitor for safety  Will monitor every 15 minutes as ordered  Will evaluate and promote the plan of care

## 2017-11-18 NOTE — NURSING NOTE
Pt. Was on floor on her hands and knees looking for a cat. She was telling us about the cat it was a calico cat.

## 2017-11-18 NOTE — NURSING NOTE
"Behavior   Anxiety gave no answer  Depression gave no answer  Pain just said yes hips  AVH gave no answer, but looks for cats.  S/I gave no answer  H/I gave no answer  Affect depressed    Note:pt. Is sitting in chair reading the paper. She says she had hip pain. She did not want breakfast says that she does not eat breakfast much. He is quiet this morning. She talked about a girl old enough to drive a car and then she said, \"April fools.\" And gave a half smile. She is not combative at this time.      Intervention  Instructed in medication usage and effects  Medications administered as ordered  Encouraged to verbalize needs      Response  Verbalized understanding   Did patient take medications as ordered yes   Did patient interact with assessment?  yes     Plan  Will monitor for safety  Will monitor every 15 minutes as ordered  Will evaluate and promote the plan of care    "

## 2017-11-18 NOTE — NURSING NOTE
Patient has been up most of night, wondering about unit.  She approaches staff but has diff. Expressing her needs.  She continued to call for her cat.  Wanted to leave when peer kept wanting to leave.  She finally went to bed at 0415.

## 2017-11-19 LAB — GLUCOSE BLDC GLUCOMTR-MCNC: 102 MG/DL (ref 70–130)

## 2017-11-19 PROCEDURE — 94799 UNLISTED PULMONARY SVC/PX: CPT

## 2017-11-19 PROCEDURE — 82962 GLUCOSE BLOOD TEST: CPT

## 2017-11-19 PROCEDURE — 99232 SBSQ HOSP IP/OBS MODERATE 35: CPT | Performed by: PSYCHIATRY & NEUROLOGY

## 2017-11-19 PROCEDURE — 94760 N-INVAS EAR/PLS OXIMETRY 1: CPT

## 2017-11-19 RX ORDER — DULOXETIN HYDROCHLORIDE 30 MG/1
30 CAPSULE, DELAYED RELEASE ORAL DAILY
Status: COMPLETED | OUTPATIENT
Start: 2017-11-20 | End: 2017-11-20

## 2017-11-19 RX ADMIN — ATORVASTATIN CALCIUM 40 MG: 40 TABLET, FILM COATED ORAL at 21:13

## 2017-11-19 RX ADMIN — TRAMADOL HYDROCHLORIDE 50 MG: 50 TABLET, FILM COATED ORAL at 06:08

## 2017-11-19 RX ADMIN — GABAPENTIN 600 MG: 300 CAPSULE ORAL at 06:11

## 2017-11-19 RX ADMIN — GABAPENTIN 600 MG: 300 CAPSULE ORAL at 13:23

## 2017-11-19 RX ADMIN — RISPERIDONE 2 MG: 1 TABLET ORAL at 21:13

## 2017-11-19 RX ADMIN — TRAZODONE HYDROCHLORIDE 50 MG: 50 TABLET ORAL at 21:13

## 2017-11-19 RX ADMIN — LIDOCAINE 1 PATCH: 50 PATCH CUTANEOUS at 08:15

## 2017-11-19 RX ADMIN — IPRATROPIUM BROMIDE AND ALBUTEROL SULFATE 3 ML: 2.5; .5 SOLUTION RESPIRATORY (INHALATION) at 07:17

## 2017-11-19 RX ADMIN — GABAPENTIN 600 MG: 300 CAPSULE ORAL at 21:13

## 2017-11-19 RX ADMIN — FENOFIBRATE 145 MG: 145 TABLET ORAL at 08:15

## 2017-11-19 RX ADMIN — LEVOTHYROXINE SODIUM 112 MCG: 112 TABLET ORAL at 06:11

## 2017-11-19 RX ADMIN — SERTRALINE HYDROCHLORIDE 50 MG: 50 TABLET ORAL at 11:58

## 2017-11-19 RX ADMIN — IPRATROPIUM BROMIDE AND ALBUTEROL SULFATE 3 ML: 2.5; .5 SOLUTION RESPIRATORY (INHALATION) at 11:18

## 2017-11-19 RX ADMIN — CLONAZEPAM 0.25 MG: 0.5 TABLET ORAL at 08:16

## 2017-11-19 RX ADMIN — DULOXETINE 60 MG: 60 CAPSULE, DELAYED RELEASE ORAL at 08:16

## 2017-11-19 RX ADMIN — IPRATROPIUM BROMIDE AND ALBUTEROL SULFATE 3 ML: 2.5; .5 SOLUTION RESPIRATORY (INHALATION) at 14:45

## 2017-11-19 RX ADMIN — TRAMADOL HYDROCHLORIDE 50 MG: 50 TABLET, FILM COATED ORAL at 21:13

## 2017-11-19 RX ADMIN — CYCLOBENZAPRINE HYDROCHLORIDE 10 MG: 10 TABLET, FILM COATED ORAL at 08:15

## 2017-11-19 RX ADMIN — FLUTICASONE PROPIONATE 2 SPRAY: 50 SPRAY, METERED NASAL at 08:15

## 2017-11-19 NOTE — PLAN OF CARE
Problem: BH Patient Care Overview (Adult)  Goal: Plan of Care Review  Outcome: Ongoing (interventions implemented as appropriate)  Goal: Interdisciplinary Rounds/Family Conference  Outcome: Ongoing (interventions implemented as appropriate)  Goal: Individualization and Mutuality  Outcome: Ongoing (interventions implemented as appropriate)  Goal: Discharge Needs Assessment  Outcome: Ongoing (interventions implemented as appropriate)    Problem:  Overarching Goals  Goal: Adheres to Safety Considerations for Self and Others  Outcome: Ongoing (interventions implemented as appropriate)  Goal: Optimized Coping Skills in Response to Life Stressors  Outcome: Ongoing (interventions implemented as appropriate)  Goal: Develops/Participates in Therapeutic [place] to Support Successful Transition  Outcome: Ongoing (interventions implemented as appropriate)    Problem: Hearing Impairment/Deaf (Adult,Obstetrics,Pediatric)  Goal: Identify Related Risk Factors and Signs and Symptoms  Outcome: Ongoing (interventions implemented as appropriate)    Problem: Fall Risk (Adult)  Goal: Identify Related Risk Factors and Signs and Symptoms  Outcome: Ongoing (interventions implemented as appropriate)  Goal: Absence of Falls  Outcome: Ongoing (interventions implemented as appropriate)

## 2017-11-19 NOTE — NURSING NOTE
Behavior   Anxiety 0  Depression 0  Pain 0  AVH gave no answer  S/I no  H/I no  Affect depressed    Note: Pt. Was not hearing well so she reads well she denies any pain or depression or anxiety. She is resting now in the [address]. She has been calmer this morning I have not seen her try to get out the door. She seems sleepy this morning and more relaxed thou. She ate her breakfast and took her medications easily.       Intervention  Instructed in medication usage and effects  Medications administered as ordered  Encouraged to verbalize needs      Response  Verbalized understanding   Did patient take medications as ordered yes   Did patient interact with assessment?  yes     Plan  Will monitor for safety  Will monitor every 15 minutes as ordered  Will evaluate and promote the plan of care

## 2017-11-19 NOTE — PLAN OF CARE
Problem: BH Patient Care Overview (Adult)  Goal: Plan of Care Review  Outcome: Ongoing (interventions implemented as appropriate)  Goal: Individualization and Mutuality  Outcome: Ongoing (interventions implemented as appropriate)  Goal: Discharge Needs Assessment  Outcome: Ongoing (interventions implemented as appropriate)    Problem: BH Overarching Goals  Goal: Adheres to Safety Considerations for Self and Others  Outcome: Ongoing (interventions implemented as appropriate)  Goal: Optimized Coping Skills in Response to Life Stressors  Outcome: Ongoing (interventions implemented as appropriate)  Goal: Develops/Participates in Therapeutic [place] to Support Successful Transition  Outcome: Ongoing (interventions implemented as appropriate)

## 2017-11-19 NOTE — NURSING NOTE
Patient given trazodone and tramadol at bedtime, did sleep better.  She slept approx 6 hours.  This RN had to remove her from male patient's room.

## 2017-11-19 NOTE — NURSING NOTE
Behavior   Anxiety 0  Depression 0  Pain 5  AVH no  S/I no  H/I no  Affect flat    Note:  Patient was up wondering about unit.  She would doze off/on when sitting in chair.  Noted she is using walker for ambulation.  She continues to stand at door and knock, trying to get thru door.  Oriented to name/place.  Staff cont. to write down questions so she can read it to respond.  Continues to make gutteral noises.      Intervention  Offered snack, hs meds.  Attempted to interview patient.  Instructed in medication usage and effects  Medications administered as ordered  Encouraged to verbalize needs      Response  Patient continues to be intrusive at times.  Wonders about unit.  Dozes off/on in chair.  She did about 2130 go to bed.  She took tramadol for chronic pain and trazodone for sleep.  Remains high risk for fall.  Verbalized understanding   Did patient take medications as ordered yes   Did patient interact with assessment?  yes     Plan  Will monitor for safety  Will monitor every 15 minutes as ordered  Will evaluate and promote the plan of care

## 2017-11-19 NOTE — NURSING NOTE
Pt. Has been calm today, she has not been watching the door as much today. She has been in day area most of the day. She has not been looking for cats. She has been resting in chair often not as restless and hopeless, tearful. She seems not to be as worried about family dying or dead today.

## 2017-11-19 NOTE — PROGRESS NOTES
11/19/2017    Chief Complaint: psychosis    Subjective:  Patient is a 62 y.o. female who was hospitalized for psychosis.   Patient is asleep in a chair this morning.  She is less fixated on her nephew and on leaving.  She notes weakness when she gets up and when she walks.  This is confirmed on observation.    Objective     Vital Signs    Temp:  [95.6 °F (35.3 °C)-97.6 °F (36.4 °C)] 97.6 °F (36.4 °C)  Heart Rate:  [] 104  Resp:  [18-20] 18  BP: (109-114)/(58-78) 114/78    Physical Exam:   General Appearance: alert, appears stated age and cooperative,  Hygiene:   good  Gait & Station: less steady  Musculoskeletal: No tremors or abnormal involuntary movements    Mental Status Exam:   Cooperation:  Cooperative  Eye Contact:  Fair  Psychomotor Behavior:  Slow  Mood: Euthymic  Affect:  constricted  Speech:  slow and poor enunciation at times makes it difficult to understand everything she is saying.  Thought Process:  [place]  Associations: Goal Directed  Thought Content:     Normal   Suicidal:  None   Homicidal:  None   Hallucinations:  Auditory   Delusion:  Continued delusion about nephew but less vocal about it.  Cognitive Functioning:   Consciousness: awake and Orientation:  Person  Reliability:  fair  Insight:  Poor  Judgement:  Fair  Impulse Control:  Fair    Lab Results (last 24 hours)     ** No results found for the last 24 hours. **        Imaging Results (last 24 hours)     ** No results found for the last 24 hours. **          Medicine:   Current Facility-Administered Medications:   •  acetaminophen (TYLENOL) tablet 650 mg, 650 mg, Oral, Q4H PRN, Faisal Jones MD, 650 mg at 11/16/17 1837  •  aluminum-magnesium hydroxide-simethicone (MAALOX MAX) 400-400-40 MG/5ML suspension 15 mL, 15 mL, Oral, Q6H PRN, Faisal Jones MD, 15 mL at 11/14/17 2309  •  atorvastatin (LIPITOR) tablet 40 mg, 40 mg, Oral, Nightly, Magali Cooper MD, 40 mg at 11/18/17 2104  •  cyclobenzaprine (FLEXERIL) tablet 10 mg, 10  mg, Oral, Daily, Magali Cooper MD, 10 mg at 11/19/17 0815  •  DULoxetine (CYMBALTA) DR capsule 60 mg, 60 mg, Oral, Daily, Magali Cooper MD, 60 mg at 11/19/17 0816  •  fenofibrate (TRICOR) tablet 145 mg, 145 mg, Oral, Daily, Magali Cooper MD, 145 mg at 11/19/17 0815  •  fluticasone (FLONASE) 50 MCG/ACT nasal spray 2 spray, 2 spray, Each Nare, Daily, Magali Cooper MD, 2 spray at 11/19/17 0815  •  gabapentin (NEURONTIN) capsule 600 mg, 600 mg, Oral, Q8H, Magali Cooper MD, 600 mg at 11/19/17 0611  •  hydrOXYzine (VISTARIL) capsule 50 mg, 50 mg, Oral, Q6H PRN, Faisal Jones MD, 50 mg at 11/18/17 0216  •  ipratropium-albuterol (DUO-NEB) nebulizer solution 3 mL, 3 mL, Nebulization, 4x Daily - RT, Magali Cooper MD, 3 mL at 11/19/17 0717  •  levothyroxine (SYNTHROID, LEVOTHROID) tablet 112 mcg, 112 mcg, Oral, Q AM, Faisal Jones MD, 112 mcg at 11/19/17 0611  •  lidocaine (LIDODERM) 5 % 1 patch, 1 patch, Transdermal, Q24H, Magali Cooper MD, 1 patch at 11/19/17 0815  •  loperamide (IMODIUM) capsule 2 mg, 2 mg, Oral, 4x Daily PRN, Faisal Jones MD  •  magnesium hydroxide (MILK OF MAGNESIA) suspension 2400 mg/10mL 10 mL, 10 mL, Oral, Daily PRN, Faisal Jones MD  •  nicotine (NICODERM CQ) 14 MG/24HR patch 1 patch, 1 patch, Transdermal, Q24H, Faisal Jones MD, 1 patch at 11/16/17 0857  •  risperiDONE (risperDAL) tablet 2 mg, 2 mg, Oral, Nightly, Faisal Jones MD, 2 mg at 11/18/17 2104  •  traMADol (ULTRAM) tablet 50 mg, 50 mg, Oral, Q6H PRN, Magali Copoer MD, 50 mg at 11/19/17 0608  •  traZODone (DESYREL) tablet 50 mg, 50 mg, Oral, Nightly PRN, Faisal Jones MD, 50 mg at 11/18/17 2104  •  triamcinolone (KENALOG) 0.1 % cream, , Topical, Q12H, Alhaji Elmore MD, 1 application at 11/17/17 0805    Diagnoses/Assessment:   Principal Problem:    Major depressive disorder, recurrent, severe with psychotic features  Active Problems:    Bilateral deafness      Treatment Plan:    1) Will  continue care for the patient on the behavioral health unit at [facility] to ensure patient safety.  2) Will continue to provide treatment with the unit milieu, activities, therapies and psychopharmacological management.  3) Patient to be placed on or continued on  Q15 minute checks  and Elopement and Fall precautions.  4) Will continue medical management by Dr. Elmore.  5) Will order following labs: none  6) Will make the following medication changes: Will continue the klonopin taper.  Will continue the risperdal.  Will taper the cymbalta due to concerns of interaction with risperdal possibly elevating levels and affecting sedation and gait.  Will start zoloft.  7) Will continue discharge planning as appropriate for patient.  8) Psychotherapy provided: none   9) Will consult PT on the gait issue.    Treatment plan and medication risks and benefits discussed with: Patient    Faisal Jones MD  11/19/17  10:49 AM

## 2017-11-20 ENCOUNTER — DOCUMENTATION (OUTPATIENT)
Dept: PSYCHIATRY | Facility: HOSPITAL | Age: 62
End: 2017-11-20

## 2017-11-20 VITALS
HEIGHT: 61 IN | OXYGEN SATURATION: 90 % | WEIGHT: 141.76 LBS | SYSTOLIC BLOOD PRESSURE: 129 MMHG | HEART RATE: 90 BPM | BODY MASS INDEX: 26.76 KG/M2 | RESPIRATION RATE: 18 BRPM | DIASTOLIC BLOOD PRESSURE: 60 MMHG | TEMPERATURE: 97.7 F

## 2017-11-20 PROCEDURE — G8978 MOBILITY CURRENT STATUS: HCPCS | Performed by: PHYSICAL THERAPIST

## 2017-11-20 PROCEDURE — 97162 PT EVAL MOD COMPLEX 30 MIN: CPT | Performed by: PHYSICAL THERAPIST

## 2017-11-20 PROCEDURE — 99238 HOSP IP/OBS DSCHRG MGMT 30/<: CPT | Performed by: PSYCHIATRY & NEUROLOGY

## 2017-11-20 PROCEDURE — G8979 MOBILITY GOAL STATUS: HCPCS | Performed by: PHYSICAL THERAPIST

## 2017-11-20 RX ORDER — TRAZODONE HYDROCHLORIDE 50 MG/1
50 TABLET ORAL NIGHTLY PRN
Qty: 30 TABLET | Refills: 0 | Status: SHIPPED | OUTPATIENT
Start: 2017-11-20

## 2017-11-20 RX ORDER — RISPERIDONE 2 MG/1
2 TABLET ORAL NIGHTLY
Qty: 30 TABLET | Refills: 0 | Status: SHIPPED | OUTPATIENT
Start: 2017-11-20

## 2017-11-20 RX ORDER — LEVOTHYROXINE SODIUM 112 UG/1
112 TABLET ORAL DAILY
Qty: 30 TABLET | Refills: 0 | Status: SHIPPED | OUTPATIENT
Start: 2017-11-20

## 2017-11-20 RX ADMIN — FENOFIBRATE 145 MG: 145 TABLET ORAL at 09:45

## 2017-11-20 RX ADMIN — FLUTICASONE PROPIONATE 2 SPRAY: 50 SPRAY, METERED NASAL at 09:45

## 2017-11-20 RX ADMIN — LIDOCAINE 1 PATCH: 50 PATCH CUTANEOUS at 09:54

## 2017-11-20 RX ADMIN — TRIAMCINOLONE ACETONIDE: 1 CREAM TOPICAL at 09:46

## 2017-11-20 RX ADMIN — LEVOTHYROXINE SODIUM 112 MCG: 112 TABLET ORAL at 06:11

## 2017-11-20 RX ADMIN — GABAPENTIN 600 MG: 300 CAPSULE ORAL at 06:11

## 2017-11-20 RX ADMIN — DULOXETINE HYDROCHLORIDE 30 MG: 30 CAPSULE, DELAYED RELEASE ORAL at 09:45

## 2017-11-20 RX ADMIN — SERTRALINE HYDROCHLORIDE 100 MG: 50 TABLET ORAL at 09:45

## 2017-11-20 RX ADMIN — CYCLOBENZAPRINE HYDROCHLORIDE 10 MG: 10 TABLET, FILM COATED ORAL at 09:46

## 2017-11-20 RX ADMIN — TRAMADOL HYDROCHLORIDE 50 MG: 50 TABLET, FILM COATED ORAL at 06:11

## 2017-11-20 RX ADMIN — TRAMADOL HYDROCHLORIDE 50 MG: 50 TABLET, FILM COATED ORAL at 12:20

## 2017-11-20 NOTE — PROGRESS NOTES
Patient has participated in several groups, including cards, playing wheel of fortune and current events discussion.  She participated well in all of the groups and demonstrated good understanding of activities.

## 2017-11-20 NOTE — DISCHARGE SUMMARY
Admission Date: 11/10/2017    Discharge Date: 11/20/2017    Psychiatric History: Patient is a 62 y.o. female who was hospitalized for psychosis.   She has been struggling with AVH and delusions.     She lost hearing in her early 30's.  Her cochlear implant hearing aid was lost at the nursing home.  She thinks sister and nephew are dead though that is not true.  Sister believes that she may be over medicated and that is causing the hallucinations and delusions.  Sister denies that there was any history of this in the past.     Spoke with the DON at the nursing home.  She has always cried and moaned and walked the halls at night. She had delusions and hallucinations before the issue with the cochlear implant.  She uses the dry erase board to communicate.  She seems to have increase in moaning and crying and AH are worse and she does not seem to be getting better from this since losing the cochlear implant device.  She has also been pecking at her head since then.  She has appointment in December to try to get her hearing aid fixed.    History  Past psychiatric history:     Psychiatric Hospitalizations: Patient's hospitalizations have been for psychotic episodes.     Suicide Attempts: Patient has had no prior suicide attempts.     Prior Treatment and Medications Tried: unknown     History of violence or legal issues: The patient has no significant history of legal issues.    Diagnostic Data:    Recent Results (from the past 168 hour(s))   Lipid Panel    Collection Time: 11/14/17  6:08 AM   Result Value Ref Range    Total Cholesterol 138 0 - 199 mg/dL    Triglycerides 141 20 - 199 mg/dL    HDL Cholesterol 35 (L) 60 - 200 mg/dL    LDL Cholesterol  85 1 - 129 mg/dL    LDL/HDL Ratio 2.14 0.00 - 3.22   Valproic Acid Level, Total    Collection Time: 11/14/17  6:08 AM   Result Value Ref Range    Valproic Acid 73.4 50.0 - 120.0 mcg/mL   Ammonia    Collection Time: 11/14/17  6:08 AM   Result Value Ref Range    Ammonia <9 (L) 9  - 30 umol/L   Comprehensive Metabolic Panel    Collection Time: 11/14/17  6:08 AM   Result Value Ref Range    Glucose 97 60 - 100 mg/dL    BUN 13 7 - 21 mg/dL    Creatinine 0.90 0.50 - 1.00 mg/dL    Sodium 141 137 - 145 mmol/L    Potassium 4.0 3.5 - 5.1 mmol/L    Chloride 99 95 - 110 mmol/L    CO2 29.0 22.0 - 31.0 mmol/L    Calcium 10.1 8.4 - 10.2 mg/dL    Total Protein 7.9 6.3 - 8.6 g/dL    Albumin 4.50 3.40 - 4.80 g/dL    ALT (SGPT) 52 9 - 52 U/L    AST (SGOT) 76 (H) 14 - 36 U/L    Alkaline Phosphatase 150 (H) 38 - 126 U/L    Total Bilirubin 0.6 0.2 - 1.3 mg/dL    eGFR Non  Amer 64 45 - 104 mL/min/1.73    Globulin 3.4 2.3 - 3.5 gm/dL    A/G Ratio 1.3 1.1 - 1.8 g/dL    BUN/Creatinine Ratio 14.4 7.0 - 25.0    Anion Gap 13.0 5.0 - 15.0 mmol/L   TSH    Collection Time: 11/14/17  6:08 AM   Result Value Ref Range    TSH 24.600 (H) 0.460 - 4.680 mIU/mL   Lavender Top    Collection Time: 11/14/17  6:08 AM   Result Value Ref Range    Extra Tube hold for add-on    POC Glucose Fingerstick    Collection Time: 11/19/17 11:36 AM   Result Value Ref Range    Glucose 102 70 - 130 mg/dL     No results found.    Summary of Hospital Course:  Patient was admitted to the behavioral health unit at [facility] to ensure patient safety.  Patient was provided treatment with the unit milieu, activities, therapies and psychopharmacological management.  Patient was placed on Q15 minute checks and Suicide.  Dr. Elmore was consulted for management of medical co-morbidities.  Patient was restarted on the following psychiatric medications: Patient had all her home medications restarted including the neurontin, cymbalta, zoloft, depakote and klonopin.  The following medication changes were made during the hospital stay: Her klonopin and depakote were slowly tapered off.  Her zoloft was initially tapered off and she was continued on the cymbalta.  She was given risperdal to address psychosis and dose was increased to 1.5mg  bid.  She had increase in sedation and dose was decreased to 2mg qhs.  Due to continued sedation her cymbalta was stopped and her zoloft was restarted.  She had improvement in mood and behavior.  She continued to have some low level delusions but they were less intrusive and she stopped expressing them spontaneously.  Patient had improvement over the course of the hospital stay and tolerated his medications.  Substance abuse issues were not present.    She had some weakness in her gait and PT was consulted.    Patients Condition at Discharge:  Patient is stable for discharge and is not an imminent threat to self or others.  The patient's behavrior was Appropriate.  Patient reported that mood was Euthymic.  Patient's affect was normal.  Patient's thought content was as follows:   Suicidal:  None   Homicidal:  None   Hallucinations:  None   Delusion:  None    Discharge Diagnosis:  Principal Problem:    Major depressive disorder, recurrent, severe with psychotic features  Active Problems:    Bilateral deafness      Discharge Medications:      Your medication list      START taking these medications       Instructions Last Dose Given Next Dose Due    risperiDONE 2 MG tablet   Commonly known as:  risperDAL        Take 1 tablet by mouth Every Night.         traZODone 50 MG tablet   Commonly known as:  DESYREL        Take 1 tablet by mouth At Night As Needed for Sleep (insomnia). To give if not asleep within one hour of going to bed.           CHANGE how you take these medications       Instructions Last Dose Given Next Dose Due    levothyroxine 112 MCG tablet   Commonly known as:  SYNTHROID, LEVOTHROID   What changed:    - medication strength  - how much to take        Take 1 tablet by mouth Daily.           CONTINUE taking these medications       Instructions Last Dose Given Next Dose Due    albuterol 108 (90 Base) MCG/ACT inhaler   Commonly known as:  PROVENTIL HFA;VENTOLIN HFA              atorvastatin 40 MG tablet    Commonly known as:  LIPITOR              cyclobenzaprine 10 MG tablet   Commonly known as:  FLEXERIL              fenofibrate 145 MG tablet   Commonly known as:  TRICOR              fluticasone 50 MCG/ACT nasal spray   Commonly known as:  FLONASE              folic acid 1 MG tablet   Commonly known as:  FOLVITE              FOSAMAX 70 MG tablet   Generic drug:  alendronate              gabapentin 600 MG tablet   Commonly known as:  NEURONTIN              ibuprofen 400 MG tablet   Commonly known as:  ADVIL,MOTRIN              sertraline 100 MG tablet   Commonly known as:  ZOLOFT                STOP taking these medications          clonazePAM 0.5 MG tablet   Commonly known as:  KlonoPIN           clonazePAM 1 MG tablet   Commonly known as:  KlonoPIN           divalproex 250 MG DR tablet   Commonly known as:  DEPAKOTE           divalproex 500 MG DR tablet   Commonly known as:  DEPAKOTE           DULoxetine 30 MG capsule   Commonly known as:  CYMBALTA           DULoxetine 60 MG capsule   Commonly known as:  CYMBALTA                Where to Get Your Medications      You can get these medications from any pharmacy     Bring a paper prescription for each of these medications    • levothyroxine 112 MCG tablet   • risperiDONE 2 MG tablet   • traZODone 50 MG tablet             Justification for multiple antipsychotic medications at discharge:  Not Applicable.    Medication for smoking cessation: Patient does not smoke and medication is not indicated.    Medication for substance abuse: Patient does not have a substance use diagnosis and medication is not indicated.    Disposition: Patient was discharged to nursing home.    Follow-up Information     Follow up with Theron Nazario MD .    Specialty:  Family Medicine    Contact information:    [address] [facility] [address] [phone]            Physical therapy ordered for gait training.    Time Spent: Less than 30 minutes.    Faisal Jones  MD  11/20/17  11:26 AM

## 2017-11-20 NOTE — NURSING NOTE
Patient ambulated from [facility] for discharge back to [facility] with their staff.  Patient stable with no s/sx of distress.  All discharge paperwork and prescriptions reviewed with patient.  Patient verbalized understanding.  All paperwork signed.  Prescriptions and personal belongings returned to patient.

## 2017-11-20 NOTE — NURSING NOTE
Behavior   Anxiety 0  Depression 0  Pain 5  AVH no  S/I no  H/I no  Affect flat    Note:  Patient has remained up in day area, watching tv.  Does not socialize with peers d/t hearing loss.  She has not been in floor looking for/calling her cat.  Not as restless today, able to sit for longer periods of time.  Not wondering about unit.  Seems calmer, quieter.        Intervention  Given bedtime meds, hs snack.  Attempted to interview patient.  Instructed in medication usage and effects  Medications administered as ordered  Encouraged to verbalize needs      Response  Its difficult to talk with patient, staff has to write everything down.  She does watch TV even tho she can't hear it.  Seems calmer, quieter tonight.  States back ache is better tonight.  Verbalized understanding   Did patient take medications as ordered yes   Did patient interact with assessment?  yes     Plan  Will monitor for safety  Will monitor every 15 minutes as ordered  Will evaluate and promote the plan of care

## 2017-11-20 NOTE — PLAN OF CARE
Problem: BH Patient Care Overview (Adult)  Goal: Plan of Care Review  Outcome: Ongoing (interventions implemented as appropriate)    11/20/17 0925   Coping/Psychosocial Response Interventions   Plan Of Care Reviewed With patient   Outcome Evaluation   Outcome Summary/Follow up Plan Patient seen for initital PT eval, was able to follow commands with visual cues and being in line of sight of mouth of person speaking, was able to ambulate with rolling walker 150 feet CGA assist of one, was able to sit down and get up from toilet with supervision of one, had some difficulty getting legs up onto bed due to complaints of pain in L leg but was able to complete the task, could benefit from skilled PT to regain and return to highest level of function in bed mobility, transfers and gait       Goal: Discharge Needs Assessment  Outcome: Ongoing (interventions implemented as appropriate)    11/20/17 0925   Discharge Needs Assessment   Concerns To Be Addressed no discharge needs identified   Discharge Needs Assessment   Discharge Disposition nursing/skilled nursing care   Living Environment   Transportation Available (unknown)         Problem: Inpatient Physical Therapy  Goal: Bed Mobility Goal STG- PT  Outcome: Ongoing (interventions implemented as appropriate)    11/20/17 0925   Bed Mobility PT STG   Bed Mobility PT STG, Date Established 11/20/17   Bed Mobility PT STG, Time to Achieve 2 days   Bed Mobility PT STG, Activity Type all bed mobility;supine to sit/sit to supine   Bed Mobility PT STG, [place] Level independent   Transfer Training Goal, Assist Device bed rails       Goal: Transfer Training Goal 1 STG- PT  Outcome: Ongoing (interventions implemented as appropriate)    11/20/17 0925   Transfer Training PT STG   Transfer Training PT STG, Date Established 11/20/17   Transfer Training PT STG, Time to Achieve 2 days   Transfer Training PT STG, Activity Type bed to chair /chair to bed;sit to stand/stand to sit   Transfer  Training PT STG, [place] Level conditional independence;independent   Transfer Training PT STG, Assist Device walker, rolling       Goal: Gait Training Goal LTG- PT  Outcome: Ongoing (interventions implemented as appropriate)    11/20/17 0925   Gait Training PT LTG   Gait Training Goal PT LTG, Date Established 11/20/17   Gait Training Goal PT LTG, Time to Achieve by discharge   Gait Training Goal PT LTG, [place] Level conditional independence;independent   Gait Training Goal PT LTG, Assist Device walker, rolling   Gait Training Goal PT LTG, Distance to Achieve 200 feet       Goal: Strength Goal LTG- PT  Outcome: Ongoing (interventions implemented as appropriate)    11/20/17 0925   Strength Goal PT LTG   Strength Goal PT LTG, Date Established 11/20/17   Strength Goal PT LTG, Time to Achieve by discharge   Strength Goal PT LTG, Measure to Achieve 20 reps all ex BLE   Strength Goal PT LTG, Functional Goal be able toget BLE up onto bed Independently without pain

## 2017-11-20 NOTE — DISCHARGE SUMMARY
Pt presented in interview room dressed appropriately. Mood is calm & affect is bright, pt. has difficulty hearing. Pt. seems to be at baseline. Pt. was referred back to [facility]. Their van picked the pts. Pt is stable. Pt denies SI/Hi, AVH. Pt participated sometimes in group therapy CSW educated pt on Crisis Hotline. BPRS was completed upon dc.

## 2017-11-20 NOTE — THERAPY DISCHARGE NOTE
Acute Care - Physical Therapy Discharge Summary  [facility]       Patient Name: Ghazala Lawson  : 1955  MRN: [id number]    Today's Date: 2017  Onset of Illness/Injury or Date of Surgery Date: 11/10/17    Date of Referral to PT: 17  Referring Physician: Dr Jones      Admit Date: 11/10/2017      PT Recommendation and Plan    Visit Dx:    ICD-10-CM ICD-9-CM   1. Impaired physical mobility Z74.09 781.99             Outcome Measures       17 0925          How much help from another person do you currently need...    Turning from your back to your side while in flat bed without using bedrails? 4  -CB      Moving from lying on back to sitting on the side of a flat bed without bedrails? 3  -CB      Moving to and from a bed to a chair (including a wheelchair)? 3  -CB      Standing up from a chair using your arms (e.g., wheelchair, bedside chair)? 3  -CB      Climbing 3-5 steps with a railing? 2  -CB      To walk in hospital room? 3  -CB      AM-PAC 6 Clicks Score 18  -CB      Functional Assessment    Outcome Measure Options AM-PAC 6 Clicks Basic Mobility (PT)  -CB        User Key  (r) = Recorded By, (t) = Taken By, (c) = Cosigned By    Initials Name Provider Type    ROSIE Stahl, PT Physical Therapist                PT Charges       17 1119          Time Calculation    Start Time 0925  -CB      Stop Time 0948  -CB      Time Calculation (min) 23 min  -CB      PT Received On 17  -CB      PT Goal Re-Cert Due Date 17  -CB        User Key  (r) = Recorded By, (t) = Taken By, (c) = Cosigned By    Initials Name Provider Type    ROSIE Stahl, PT Physical Therapist                  IP PT Goals       17 0925          Bed Mobility PT STG    Bed Mobility PT STG, Date Established 17  -CB      Bed Mobility PT STG, Time to Achieve 2 days  -CB      Bed Mobility PT STG, Activity Type all bed mobility;supine to sit/sit to supine  -CB      Bed Mobility PT STG,  [place] Level independent  -CB      Transfer Training Goal, Assist Device bed rails  -CB      Transfer Training PT STG    Transfer Training PT STG, Date Established 11/20/17  -CB      Transfer Training PT STG, Time to Achieve 2 days  -CB      Transfer Training PT STG, Activity Type bed to chair /chair to bed;sit to stand/stand to sit  -CB      Transfer Training PT STG, [place] Level conditional independence;independent  -CB      Transfer Training PT STG, Assist Device walker, rolling  -CB      Gait Training PT LTG    Gait Training Goal PT LTG, Date Established 11/20/17  -CB      Gait Training Goal PT LTG, Time to Achieve by discharge  -CB      Gait Training Goal PT LTG, [place] Level conditional independence;independent  -CB      Gait Training Goal PT LTG, Assist Device walker, rolling  -CB      Gait Training Goal PT LTG, Distance to Achieve 200 feet  -CB      Strength Goal PT LTG    Strength Goal PT LTG, Date Established 11/20/17  -CB      Strength Goal PT LTG, Time to Achieve by discharge  -CB      Strength Goal PT LTG, Measure to Achieve 20 reps all ex BLE  -CB      Strength Goal PT LTG, Functional Goal be able toget BLE up onto bed Independently without pain  -CB        User Key  (r) = Recorded By, (t) = Taken By, (c) = Cosigned By    Initials Name Provider Type    CB Venus Stahl, PT Physical Therapist              PT Discharge Summary  Anticipated Discharge Disposition: skilled nursing facility  Reason for Discharge: Discharge from facility, Per MD order  Outcomes Achieved: Unable to make functional progress toward goals at this time  Discharge Destination: SNF      Robert Pyle, PTA   11/20/2017

## 2017-11-20 NOTE — NURSING NOTE
Patient has slept this night, approx. 7 hours.  She was given trazodone and tramadol, combination seems to help her rest better.

## 2017-11-20 NOTE — THERAPY EVALUATION
Behavioral Health - Physical Therapy Initial Evaluation  [facility]     Patient Name: Ghazala Lawson  : 1955  MRN: [id number]  Today's Date: 2017   Onset of Illness/Injury or Date of Surgery Date: 11/10/17  Date of Referral to PT: 17  Referring Physician: Dr Jones      Admit Date: 11/10/2017     Visit Dx:    ICD-10-CM ICD-9-CM   1. Impaired physical mobility Z74.09 781.99     Patient Active Problem List   Diagnosis   • Major depressive disorder, recurrent, severe with psychotic features   • Bilateral deafness     Past Medical History:   Diagnosis Date   • Anxiety    • Chronic pain disorder    • Depression    • Disease of thyroid gland    • Psychosis      History reviewed. No pertinent surgical history.       PT ASSESSMENT (last 72 hours)      PT Evaluation       17       Rehab Evaluation    Document Type evaluation  -CB     Subjective Information agree to therapy  -CB     Patient Effort, Rehab Treatment adequate  -CB     Symptoms Noted During/After Treatment none  -CB     General Information    Patient Profile Review yes  -CB     Onset of Illness/Injury or Date of Surgery Date 11/10/17  -CB     Referring Physician Dr Jones  -CB     General Observations B deafness, has walker in her room but she goes into bathroom without using it  -CB     Pertinent History Of Current Problem agressive and mood changes  -CB     Precautions/Limitations fall precautions   elopment precautions, must be able to see your mouth to unde  -CB     Prior Level of Function independent:;gait;min assist:;ADL's  -CB     Equipment Currently Used at Home --   unknown  -CB     Plans/Goals Discussed With patient  -CB     Living Environment    Lives With facility resident  -CB     Living Arrangements residential facility  -CB     Home Accessibility no concerns  -CB     Transportation Available --   unknown  -CB     Clinical Impression    Date of Referral to PT 17  -CB     PT Diagnosis impaired  physical mobility due to psychosis  -CB     Criteria for Skilled Therapeutic Interventions Met treatment indicated;yes  -CB     Pathology/Pathophysiology Noted (Describe Specifically for Each System) musculoskeletal  -CB     Impairments Found (describe specific impairments) gait, locomotion, and balance  -CB     Functional Limitations in Following Categories (Describe Specific Limitations) self-care  -CB     Rehab Potential good, to achieve stated therapy goals  -CB     Vital Signs    Pre Systolic BP Rehab 130  -CB     Pre Treatment Diastolic BP 75  -CB     Post Systolic BP Rehab 137  -CB     Post Treatment Diastolic BP 72  -CB     Pretreatment Heart Rate (beats/min) 90  -CB     Posttreatment Heart Rate (beats/min) 96  -CB     Pre SpO2 (%) 92  -CB     O2 Delivery Pre Treatment room air  -CB     Post SpO2 (%) 94  -CB     O2 Delivery Post Treatment supplemental O2  -CB     Pre Patient Position Supine  -CB     Intra Patient Position Standing  -CB     Post Patient Position Sitting  -CB     Pain Assessment    Pain Assessment --   unable to obtain  -CB     Vision Assessment/Intervention    Visual Impairment WFL with corrective lenses  -CB     Cognitive Assessment/Intervention    Current Cognitive/Communication Assessment impaired  -CB     Orientation Status unable/difficult to assess  -CB     Follows Commands/Answers Questions 75% of the time  -CB     Personal Safety mild impairment  -CB     Personal Safety Interventions gait belt;nonskid shoes/slippers when out of bed  -CB     ROM (Range of Motion)    General ROM no range of motion deficits identified  -CB     MMT (Manual Muscle Testing)    General MMT Assessment no strength deficits identified   did not understand resistance  -CB     General MMT Assessment Detail grossly 3/5 all 4 extremitites  -CB     Mobility Assessment/Training    Extremity Weight-Bearing Status left lower extremity;right lower extremity  -CB     Left Lower Extremity Weight-Bearing weight-bearing as  tolerated  -CB     Right Lower Extremity Weight-Bearing weight-bearing as tolerated  -CB     Bed Mobility, Assessment/Treatment    Bed Mobility, Assistive Device head of bed elevated  -CB     Bed Mob, Supine to Sit, Yates supervision required  -CB     Bed Mob, Sit to Supine, Yates supervision required  -CB     Transfer Assessment/Treatment    Transfers, Sit-Stand Yates contact guard assist  -CB     Transfers, Stand-Sit Yates contact guard assist  -CB     Transfers, Sit-Stand-Sit, Assist Device rolling walker  -CB     Toilet Transfer, Yates contact guard assist  -CB     Toilet Transfer, Assistive Device rolling walker  -CB     Gait Assessment/Treatment    Gait, Yates Level stand by assist;contact guard assist  -CB     Gait, Assistive Device rolling walker  -CB     Gait, Distance (Feet) 150  -CB     Sensory Assessment/Intervention    Light Touch LLE;RLE  -CB     LLE Light Touch WNL  -CB     Positioning and Restraints    Pre-Treatment Position in bed  -CB     Post Treatment Position bed  -CB     In Bed sitting EOB;with nsg  -CB       User Key  (r) = Recorded By, (t) = Taken By, (c) = Cosigned By    Initials Name Provider Type    CB Venus Stahl, PT Physical Therapist          Physical Therapy Education     Title: PT OT SLP Therapies (Active)     Topic: Physical Therapy (Active)     Point: Mobility training (Active)    Learning Progress Summary    Learner Readiness Method Response Comment Documented by Status   Patient Acceptance D NR   11/20/17 1105 Active               Point: Precautions (Active)    Learning Progress Summary    Learner Readiness Method Response Comment Documented by Status   Patient Acceptance D NR   11/20/17 1105 Active                      User Key     Initials Effective Dates Name Provider Type Discipline     04/06/17 -  Venus Stahl, PT Physical Therapist PT                PT Recommendation and Plan  Anticipated Equipment Needs At Discharge:   (no additional)  Anticipated Discharge Disposition: skilled nursing facility  Planned Therapy Interventions: bed mobility training, gait training, home exercise program, patient/family education, strengthening, transfer training  PT Frequency: other (see comments) (5-14)  Plan of Care Review  Plan Of Care Reviewed With: patient  Outcome Summary/Follow up Plan: Patient seen for initital PT sreedharal, was able to follow commands with visual cues and being in line of sight of mouth of person speaking, was able to ambulate with rolling walker 150 feet  CGA assist of  one, was able to sit down and get up from toilet with supervision of one, had some difficulty getting legs up onto bed due to complaints of pain in L leg but was able to complete the task, could benefit from skilled PT to regain and return to highest level of function in bed mobility, transfers and gait          IP PT Goals       11/20/17 0925          Bed Mobility PT STG    Bed Mobility PT STG, Date Established 11/20/17  -CB      Bed Mobility PT STG, Time to Achieve 2 days  -CB      Bed Mobility PT STG, Activity Type all bed mobility;supine to sit/sit to supine  -CB      Bed Mobility PT STG, [place] Level independent  -CB      Transfer Training Goal, Assist Device bed rails  -CB      Transfer Training PT STG    Transfer Training PT STG, Date Established 11/20/17  -CB      Transfer Training PT STG, Time to Achieve 2 days  -CB      Transfer Training PT STG, Activity Type bed to chair /chair to bed;sit to stand/stand to sit  -CB      Transfer Training PT STG, [place] Level conditional independence;independent  -CB      Transfer Training PT STG, Assist Device walker, rolling  -CB      Gait Training PT LTG    Gait Training Goal PT LTG, Date Established 11/20/17  -CB      Gait Training Goal PT LTG, Time to Achieve by discharge  -CB      Gait Training Goal PT LTG, [place] Level conditional independence;independent  -CB      Gait Training Goal PT LTG,  Assist Device walker, rolling  -CB      Gait Training Goal PT LTG, Distance to Achieve 200 feet  -CB      Strength Goal PT LTG    Strength Goal PT LTG, Date Established 11/20/17  -CB      Strength Goal PT LTG, Time to Achieve by discharge  -CB      Strength Goal PT LTG, Measure to Achieve 20 reps all ex BLE  -CB      Strength Goal PT LTG, Functional Goal be able toget BLE up onto bed Independently without pain  -CB        User Key  (r) = Recorded By, (t) = Taken By, (c) = Cosigned By    Initials Name Provider Type    ROSIE Stahl, PT Physical Therapist                Outcome Measures       11/20/17 0925          How much help from another person do you currently need...    Turning from your back to your side while in flat bed without using bedrails? 4  -CB      Moving from lying on back to sitting on the side of a flat bed without bedrails? 3  -CB      Moving to and from a bed to a chair (including a wheelchair)? 3  -CB      Standing up from a chair using your arms (e.g., wheelchair, bedside chair)? 3  -CB      Climbing 3-5 steps with a railing? 2  -CB      To walk in hospital room? 3  -CB      AM-PAC 6 Clicks Score 18  -CB      Functional Assessment    Outcome Measure Options AM-PAC 6 Clicks Basic Mobility (PT)  -CB        User Key  (r) = Recorded By, (t) = Taken By, (c) = Cosigned By    Initials Name Provider Type    ROSIE Stahl, PT Physical Therapist           Time Calculation:         PT Charges       11/20/17 1119          Time Calculation    Start Time 0925  -CB      Stop Time 0948  -CB      Time Calculation (min) 23 min  -CB      PT Received On 11/20/17  -CB      PT Goal Re-Cert Due Date 12/13/17  -CB        User Key  (r) = Recorded By, (t) = Taken By, (c) = Cosigned By    Initials Name Provider Type    ROSIE Stahl, PT Physical Therapist          Therapy Charges for Today     Code Description Service Date Service Provider Modifiers Qty    62490896722 HC PT MOBILITY CURRENT 11/20/2017  Venus Stahl, PT GP, CK 1    92162923132 HC PT MOBILITY PROJECTED 11/20/2017 Venus Stahl, PT GP, CJ 1    94512863020 HC PT EVAL MOD COMPLEXITY 1 11/20/2017 Venus Stahl, PT GP 1          PT G-Codes  PT Professional Judgement Used?: Yes  Outcome Measure Options: AM-PAC 6 Clicks Basic Mobility (PT)  Score: 18  Functional Limitation: Mobility: Walking and moving around  Mobility: Walking and Moving Around Current Status (): At least 40 percent but less than 60 percent impaired, limited or restricted  Mobility: Walking and Moving Around Goal Status (): At least 20 percent but less than 40 percent impaired, limited or restricted      Venus Stahl, PT  11/20/2017

## 2017-11-20 NOTE — PLAN OF CARE
Problem: BH Patient Care Overview (Adult)  Goal: Plan of Care Review  Outcome: Ongoing (interventions implemented as appropriate)  Goal: Individualization and Mutuality  Outcome: Ongoing (interventions implemented as appropriate)  Goal: Discharge Needs Assessment  Outcome: Ongoing (interventions implemented as appropriate)    Problem: BH Overarching Goals  Goal: Adheres to Safety Considerations for Self and Others  Outcome: Ongoing (interventions implemented as appropriate)  Goal: Optimized Coping Skills in Response to Life Stressors  Outcome: Ongoing (interventions implemented as appropriate)  Goal: Develops/Participates in Therapeutic [address] to Support Successful Transition  Outcome: Ongoing (interventions implemented as appropriate)

## 2017-11-21 ENCOUNTER — APPOINTMENT (OUTPATIENT)
Dept: GENERAL RADIOLOGY | Age: 62
End: 2017-11-21
Payer: MEDICARE

## 2017-11-21 ENCOUNTER — APPOINTMENT (OUTPATIENT)
Dept: CT IMAGING | Age: 62
End: 2017-11-21
Payer: MEDICARE

## 2017-11-21 ENCOUNTER — HOSPITAL ENCOUNTER (EMERGENCY)
Age: 62
Discharge: HOME | End: 2017-11-22
Attending: EMERGENCY MEDICINE
Payer: MEDICARE

## 2017-11-21 DIAGNOSIS — F69 BEHAVIOR PROBLEM, ADULT: Primary | ICD-10-CM

## 2017-11-21 DIAGNOSIS — R46.89 AGGRESSIVE BEHAVIOR: ICD-10-CM

## 2017-11-21 DIAGNOSIS — S90.122A CONTUSION OF FIFTH TOE OF LEFT FOOT, INITIAL ENCOUNTER: ICD-10-CM

## 2017-11-21 DIAGNOSIS — H91.93 BILATERAL DEAFNESS: ICD-10-CM

## 2017-11-21 LAB
ACETAMINOPHEN LEVEL: <15 UG/ML
ALBUMIN SERPL-MCNC: 4.4 G/DL (ref 3.5–5.2)
ALP BLD-CCNC: 178 U/L (ref 35–104)
ALT SERPL-CCNC: 38 U/L (ref 5–33)
ANION GAP SERPL CALCULATED.3IONS-SCNC: 12 MMOL/L (ref 7–19)
AST SERPL-CCNC: 66 U/L (ref 5–32)
BASOPHILS ABSOLUTE: 0 K/UL (ref 0–0.2)
BASOPHILS RELATIVE PERCENT: 0.5 % (ref 0–1)
BILIRUB SERPL-MCNC: 0.7 MG/DL (ref 0.2–1.2)
BILIRUBIN URINE: NEGATIVE
BLOOD, URINE: NEGATIVE
BUN BLDV-MCNC: 14 MG/DL (ref 8–23)
CALCIUM SERPL-MCNC: 10.3 MG/DL (ref 8.8–10.2)
CHLORIDE BLD-SCNC: 97 MMOL/L (ref 98–111)
CLARITY: CLEAR
CO2: 29 MMOL/L (ref 22–29)
COLOR: YELLOW
CREAT SERPL-MCNC: 1 MG/DL (ref 0.5–0.9)
EOSINOPHILS ABSOLUTE: 0 K/UL (ref 0–0.6)
EOSINOPHILS RELATIVE PERCENT: 0.2 % (ref 0–5)
ETHANOL: <10 MG/DL (ref 0–0.08)
GFR NON-AFRICAN AMERICAN: 56
GLUCOSE BLD-MCNC: 111 MG/DL (ref 74–109)
GLUCOSE BLD-MCNC: 165 MG/DL
GLUCOSE BLD-MCNC: 165 MG/DL (ref 70–99)
GLUCOSE URINE: NEGATIVE MG/DL
HCT VFR BLD CALC: 34.7 % (ref 37–47)
HEMOGLOBIN: 11.3 G/DL (ref 12–16)
KETONES, URINE: NEGATIVE MG/DL
LEUKOCYTE ESTERASE, URINE: NEGATIVE
LYMPHOCYTES ABSOLUTE: 1.6 K/UL (ref 1.1–4.5)
LYMPHOCYTES RELATIVE PERCENT: 26.6 % (ref 20–40)
MCH RBC QN AUTO: 30.1 PG (ref 27–31)
MCHC RBC AUTO-ENTMCNC: 32.6 G/DL (ref 33–37)
MCV RBC AUTO: 92.5 FL (ref 81–99)
MONOCYTES ABSOLUTE: 0.7 K/UL (ref 0–0.9)
MONOCYTES RELATIVE PERCENT: 11.8 % (ref 0–10)
NEUTROPHILS ABSOLUTE: 3.5 K/UL (ref 1.5–7.5)
NEUTROPHILS RELATIVE PERCENT: 59.4 % (ref 50–65)
NITRITE, URINE: NEGATIVE
PDW BLD-RTO: 13.5 % (ref 11.5–14.5)
PERFORMED ON: ABNORMAL
PH UA: 6
PLATELET # BLD: 152 K/UL (ref 130–400)
PMV BLD AUTO: 10 FL (ref 9.4–12.3)
POTASSIUM SERPL-SCNC: 3.5 MMOL/L (ref 3.5–5)
PROTEIN UA: NEGATIVE MG/DL
RBC # BLD: 3.75 M/UL (ref 4.2–5.4)
SALICYLATE, SERUM: <3 MG/DL (ref 3–10)
SODIUM BLD-SCNC: 138 MMOL/L (ref 136–145)
SPECIFIC GRAVITY UA: 1
TOTAL PROTEIN: 8 G/DL (ref 6.6–8.7)
TSH SERPL DL<=0.05 MIU/L-ACNC: 18.65 UIU/ML (ref 0.27–4.2)
UROBILINOGEN, URINE: 0.2 E.U./DL
VALPROIC ACID LEVEL: 4.3 UG/ML (ref 50–100)
WBC # BLD: 6 K/UL (ref 4.8–10.8)

## 2017-11-21 PROCEDURE — 93005 ELECTROCARDIOGRAM TRACING: CPT

## 2017-11-21 PROCEDURE — 70450 CT HEAD/BRAIN W/O DYE: CPT

## 2017-11-21 PROCEDURE — 99285 EMERGENCY DEPT VISIT HI MDM: CPT

## 2017-11-21 PROCEDURE — 81003 URINALYSIS AUTO W/O SCOPE: CPT

## 2017-11-21 PROCEDURE — G0480 DRUG TEST DEF 1-7 CLASSES: HCPCS

## 2017-11-21 PROCEDURE — 80053 COMPREHEN METABOLIC PANEL: CPT

## 2017-11-21 PROCEDURE — 71010 XR CHEST PORTABLE: CPT

## 2017-11-21 PROCEDURE — 85025 COMPLETE CBC W/AUTO DIFF WBC: CPT

## 2017-11-21 PROCEDURE — 80164 ASSAY DIPROPYLACETIC ACD TOT: CPT

## 2017-11-21 PROCEDURE — 84443 ASSAY THYROID STIM HORMONE: CPT

## 2017-11-21 PROCEDURE — 82948 REAGENT STRIP/BLOOD GLUCOSE: CPT

## 2017-11-21 PROCEDURE — 36415 COLL VENOUS BLD VENIPUNCTURE: CPT

## 2017-11-21 RX ORDER — RISPERIDONE 2 MG/1
0.5 TABLET, FILM COATED ORAL 2 TIMES DAILY
COMMUNITY

## 2017-11-22 VITALS
HEART RATE: 87 BPM | BODY MASS INDEX: 27.6 KG/M2 | OXYGEN SATURATION: 97 % | HEIGHT: 62 IN | RESPIRATION RATE: 20 BRPM | SYSTOLIC BLOOD PRESSURE: 119 MMHG | WEIGHT: 150 LBS | TEMPERATURE: 98 F | DIASTOLIC BLOOD PRESSURE: 72 MMHG

## 2017-11-22 PROCEDURE — 99284 EMERGENCY DEPT VISIT MOD MDM: CPT | Performed by: EMERGENCY MEDICINE

## 2017-11-22 NOTE — BH NOTE
Psychiatry Initial Intake    11/22/17    Randle Dance ,a 65 y.o. female, presents to the ED for a psychiatric assessment. ED Arrival time: 21;21   [facility] Notification time: 0115 [facility] Assess time: 36  Psychiatrist call time: [phone]  Spoke with Dr. Siegel Care    Patient is referred by: arrived by ambulance     Reason for visit to ED - Presenting problem:       Pt is hard of hearing and seems to answer nonsensically ibuprofen tried writing questions down for her to read and answer and she could read the questions but answers were still did not make sense . When ask how she arrived at he ED pt responded that I knew hai had brought her and I knew him because he was  to some one else I should know. Pt is unreliable historian. She does say she does not want to harm herself but would like to harm whoever. Alethea leyvamary were we are pt responds in a shelter. States she is here for her back ,that it hurts very bad. Duration of symptoms: unable to assess    Current Stressors: unable to assess    SI:  denies   Plan: no   If yes describe:   Past SI attempts: yes  Years ago on aspirin   If yes describe:    How many: 0  Dates or Ages:   Currently able to contract for safety outside hospital: yes   Describe:     C-SSRS Completed: yes    HI: denies  If yes describe:   Delusions: denies  If yes describe:   Hallucinations: denies   If yes describe:   Risk of Harm to self: no   If yes explain:   Was it within the past 6 months: no   Risk of Harm to others: no   If yes explain:   Was it within the past 6 months: no   Anxiety 1-10:  0  Explain if increased:   Depression 1-10:  0  Explain if increased:   Risk taking behaviors: no   If yes explain:  Level of function outside hospital decreased: yes   If yes explain:       History of Psychiatric Treatment:     Previous Outpatient therapy: pt says she is not sure needs to ask her sister  If yes where & when:   Are you compliant with appointments:unable to assess  Psychiatric Hospitalizations: Yes   Where & When:   Previous Diagnosis:  yes  Previous psychiatric medications: Yes   If yes list examples:   Are you compliant with medications: Yes     Violence and Trauma History:     History of violence by patient: no   If yes explain:   History of Trauma: no    If yes explain:   History of Abuse: no,   If yes explain/by whom:       Family History:    Family history of mental illness: unable to assess   Family member & Diagnosis:  Unable to assess   Family members with suicide attempt: no   If yes explain:   Family members who completed suicide: no  If yes explain:       Substance Abuse History:     SBIRT Completed:No     Current ETOH LEVELS: <10    ETOH Abuse: denies   Age of first drink:    Date of last drink: hasn't drank in thirty years   Amount drinking daily: denied   Years of use:  0  Longest period of sobriety:   ETOH treatment history: no   If yes describe:   History of seizures, blackouts, etc. due to ETOH abuse: no   Family history of ETOH abuse: no   Legal consequences of chemical use: no     Substance/Chemical Abuse/Recreational Drug History:  Age of first substance use: 1   Substance used:   Date of last substance use:    Substance treatment history: no  Family history of substance abuse: no    Tobacco use:pt says yes when she drinks coffee    Opiates: It was discussed with pt they would not be receiving opiates unless they were within 3 days post surgery/acute injury. Patient voiced understanding and agreed.        Psychiatric Review Of Systems:     Recent Sleep changes: yes   Average hours per night: 8    With sleep aid: pt states they put her on something to quit smoking and she has been sleeping like a baby since then  Restful sleep: yes   Difficulty falling asleep: no   Difficulty staying asleep: no   Difficulty awakening: no     Recent appetite changes: yes   Recent weight changes/Pounds gained (+) or lost (-): yes    possibly 20 pounds and she is not sure how long     Energy level changes:  no   Interest/pleasure/anhedonia:  no     Medical History:     Medical Diagnosis/Issues:   CT today in [facility]  Use of 02 or CPAP: no  Ambulatory: yes  Independent Self Care: yes  Use of OTC: no   Somatic symptoms: no     PCP: Dr. Andrews Whipple M.D., MD     Current Medications:   Scheduled Meds: No current facility-administered medications for this encounter.      Current Outpatient Prescriptions:     risperiDONE (RISPERDAL) 2 MG tablet, Take 2 mg by mouth 2 times daily, Disp: , Rfl:     sertraline (ZOLOFT) 100 MG tablet, Take 100 mg by mouth daily, Disp: , Rfl:     albuterol (PROVENTIL) (2.5 MG/3ML) 0.083% nebulizer solution, Take 2.5 mg by nebulization 4 times daily, Disp: , Rfl:     gabapentin (NEURONTIN) 300 MG capsule, Take 2 capsules by mouth 3 times daily, Disp: 60 capsule, Rfl: 0    levothyroxine (SYNTHROID) 100 MCG tablet, Take 1 tablet by mouth Daily, Disp: 30 tablet, Rfl: 3    alendronate (FOSAMAX) 70 MG tablet, Take 70 mg by mouth every 7 days mondays, Disp: , Rfl:     fenofibrate (TRICOR) 145 MG tablet, Take 145 mg by mouth daily, Disp: , Rfl:     ibuprofen (ADVIL;MOTRIN) 400 MG tablet, Take 1 tablet by mouth every 6 hours as needed for Pain (Patient taking differently: Take 400 mg by mouth 2 times daily ), Disp: 120 tablet, Rfl: 3    traZODone (DESYREL) 50 MG tablet, Take 1 tablet by mouth nightly as needed for Sleep, Disp: 30 tablet, Rfl: 1    fluticasone (FLONASE) 50 MCG/ACT nasal spray, 2 sprays by Nasal route daily , Disp: , Rfl:     cyclobenzaprine (FLEXERIL) 10 MG tablet, Take 10 mg by mouth daily At 2000, Disp: , Rfl:     atorvastatin (LIPITOR) 40 MG tablet, TAKE ONE TABLET BY MOUTH EVERY DAY, Disp: 30 tablet, Rfl: 5    folic acid (FOLVITE) 1 MG tablet, TAKE ONE TABLET BY MOUTH EVERY DAY, Disp: 30 tablet, Rfl: 5    clonazePAM (KLONOPIN) 0.5 MG tablet, Take 0.5 mg by mouth See Admin Instructions Take 1 tablet every morning, 1 tablet at

## 2017-11-22 NOTE — ED NOTES
Notified [facility] to notify EMS pt is ready to transferred back to the Nursing Home.       Eddie Landau, RN  11/22/17 7009

## 2017-11-22 NOTE — ED PROVIDER NOTES
No radiographic evidence of acute cardiopulmonary process. Signed by Dr Lazaro De Luna on 11/21/2017 10:01 PM      CT Head WO Contrast   Final Result   1. No acute intracranial process. Signed by Dr Lazaro De Luna on 11/21/2017 9:59 PM              LABS:  Labs Reviewed   CBC WITH AUTO DIFFERENTIAL - Abnormal; Notable for the following:        Result Value    RBC 3.75 (*)     Hemoglobin 11.3 (*)     Hematocrit 34.7 (*)     MCHC 32.6 (*)     Monocytes % 11.8 (*)     All other components within normal limits   COMPREHENSIVE METABOLIC PANEL - Abnormal; Notable for the following:     Chloride 97 (*)     Glucose 111 (*)     CREATININE 1.0 (*)     GFR Non-African American 56 (*)     Calcium 10.3 (*)     Alkaline Phosphatase 178 (*)     ALT 38 (*)     AST 66 (*)     All other components within normal limits   TSH WITHOUT REFLEX - Abnormal; Notable for the following:     TSH 18.650 (*)     All other components within normal limits   VALPROIC ACID LEVEL, TOTAL - Abnormal; Notable for the following:     Valproic Acid Lvl 4.3 (*)     All other components within normal limits   POCT GLUCOSE - Abnormal; Notable for the following:     POC Glucose 165 (*)     All other components within normal limits   POCT GLUCOSE - Normal   ACETAMINOPHEN LEVEL   ETHANOL   SALICYLATE LEVEL   URINALYSIS       All other labs were within normal range or not returned as of this dictation. EMERGENCY DEPARTMENT COURSE and DIFFERENTIAL DIAGNOSIS/MDM:   Vitals:    Vitals:    11/21/17 2151 11/21/17 2342 11/22/17 0100 11/22/17 0230   BP:  117/69 112/66 119/72   Pulse:  91 90 87   Resp: 20 21 19 20   Temp:  98.3 °F (36.8 °C)  98 °F (36.7 °C)   TempSrc:       SpO2:  96% 97% 97%   Weight:  150 lb (68 kg)     Height:  5' 2\" (1.575 m)         MDM  Number of Diagnoses or Management Options  Aggressive behavior:   Behavior problem, adult:   Diagnosis management comments:  So looks like the patient just got out of mental health facility in [facility]. I don't know if the recent environmental change coming back to her old resident,  irritable and agitated her or what happened at the nursing home but my observations and what she is doing here did not indicate a mental health problem out of control. There is no reason for psychiatric admission and I certainly cannot find a reason for and neither has the evaluation team a reason for committal or commitment to a mental health facility. So at this time she'll be returned back to the nursing home. If she still problematic for them I hope that they'll get an evaluator or necessary personnel to witness her behavior at the nursing facility. Reassessment  I have assumed care of this patient at midnight. This patient was sent in to us from [facility] DR KEVIN OWENS. report of bizarre behavior possible hallucinations or psychosis. Initially I was told the patient had been discharged the day before from psych facility in [facility] when in fact have a discharge statement on the patient's chart showing she just left [facility] psychiatric unit she was there from the 10th to the 20th. It appears she's been admitted here once before with depression and suicidal ideation lacerated  Wrist. The patient has bilateral deafness and communication is a challenge. She does seem somewhat bizarre but she's not been problematic she's not been aggressive. I'm concerned that recent change in her environment probably caused her to act out. Since she is only been back to the nursing home on the 1st day. I will ask our psych evaluator to see the patient for further inciting guidance to care she needs at this point. She's had diagnostic studies here which are stable. Even her thyroid condition seems to be improving by the number. CONSULTS:  32 Kimmy Lebron  Patient has been evaluated and find no reason for emergent hospitalization.   PROCEDURES:  Unless otherwise noted below, none

## 2017-11-22 NOTE — ED NOTES
Pt resting quietly. No respiratory distress. Pt warm and dry. One on one sitter at bedside.       Marguerite Holcomb RN  11/22/17 1518

## 2017-11-22 NOTE — ED NOTES
[facility]  11/22/17 1075

## 2017-11-22 NOTE — ED NOTES
Pt calm and cooperative at this time. No respiratory distress. Pt warm and dry. One on one sitter at bedside.       Swapnil Hawk RN  11/22/17 4613

## 2017-11-22 NOTE — ED NOTES
YAKOV Kaur from [facility] reports pt just returned from a 3-4 week admission to [facility] yesterday.      Brian Siegel RN  11/21/17 6346

## 2017-11-22 NOTE — ED TRIAGE NOTES
NH home reports pt was being disruptive and attempting to harm other residents. Pt unable to communicate with staff.

## 2017-11-22 NOTE — ED PROVIDER NOTES
[facility]  eMERGENCY dEPARTMENT eNCOUnter      Pt Name: Cesilia Beckham  MRN: [id number]  [place] 1955  Date of evaluation: 11/21/2017  Provider: Abby Hendricks MD    CHIEF COMPLAINT       Chief Complaint   Patient presents with   [address] Problem     NH reports pt was being disruptive and trying to harm other residents         HISTORY OF PRESENT ILLNESS   (Location/Symptom, Timing/Onset, Context/Setting, Quality, Duration, Modifying Factors, Severity)  Note limiting factors. Cesilia Beckham is a 65 y.o. female who presents to the emergency department From the nursing home. Apparently the patient was being disruptive and trying to harm other residents. There are reports that she was trying to throw a chair. Nursing home states that they can't take care of her. They want her sent to [facility]. The patient was just discharged from [facility] behavioral health to their facility yesterday. The patient is unable to provide me any history. Most of the time she has incomprehensible speech. At times she is able to tell me the year. Per nursing report this is the patient's baseline. She is always oriented ×1. I am unable to assess if this patient is suicidal or homicidal.     HPI    Nursing Notes were reviewed. REVIEW OF SYSTEMS    (2-9 systems for level 4, 10 or more for level 5)     Review of Systems   Psychiatric/Behavioral: Positive for agitation, behavioral problems and confusion. The patient is nervous/anxious and is hyperactive.          Harm to other residents       I am unable to assess a full review of systems on this patient      PAST MEDICAL HISTORY     Past Medical History:   Diagnosis Date    Chronic back pain     COPD [facility]ch[facility] obstructive [facility] disease) ([facility] [address]     Depression     Fetal alcohol syndrome     Fibromyalgia     GERD (gastroesophageal reflux disease)     Hearing loss     Hyperlipidemia     Hypothyroidism     Neck pain     states has pinched nerve in neck    Palliative care encounter          SURGICAL HISTORY       Past Surgical History:   Procedure Laterality Date    BREAST SURGERY      right biopsy benign    COCHLEAR IMPLANT      COLONOSCOPY      FRACTURE SURGERY      right hip fracture    HYSTERECTOMY      UPPER GASTROINTESTINAL ENDOSCOPY           CURRENT MEDICATIONS       Previous Medications    ACETAMINOPHEN (TYLENOL) 325 MG TABLET    Take 2 tablets by mouth every 4 hours as needed for Pain    ALBUTEROL (PROVENTIL) (2.5 MG/3ML) 0.083% NEBULIZER SOLUTION    Take 2.5 mg by nebulization 4 times daily    ALBUTEROL SULFATE  (90 BASE) MCG/ACT INHALER    Inhale 2 puffs into the lungs 4 times daily As needed for shortness of air    ALENDRONATE (FOSAMAX) 70 MG TABLET    Take 70 mg by mouth every 7 days mondays    ATORVASTATIN (LIPITOR) 40 MG TABLET    TAKE ONE TABLET BY MOUTH EVERY DAY    CHOLECALCIFEROL (VITAMIN D3) 5000 UNITS TABS    Take by mouth    CLONAZEPAM (KLONOPIN) 0.5 MG TABLET    Take 0.5 mg by mouth See Admin Instructions Take 1 tablet every morning, 1 tablet at 12:00 PM, and 2 tablets (1MG) at bedtime.     CYCLOBENZAPRINE (FLEXERIL) 10 MG TABLET    Take 10 mg by mouth daily At 2000    DIVALPROEX (DEPAKOTE) 500 MG DR TABLET    Take 1 tablet by mouth nightly    DULOXETINE (CYMBALTA) 60 MG CAPSULE    Take 90 mg by mouth daily Takes a total of 90mg daily (one 60mg tablet and one 30mg tablet)    FENOFIBRATE (TRICOR) 145 MG TABLET    Take 145 mg by mouth daily    FLUTICASONE (FLONASE) 50 MCG/ACT NASAL SPRAY    2 sprays by Nasal route daily     FOLIC ACID (FOLVITE) 1 MG TABLET    TAKE ONE TABLET BY MOUTH EVERY DAY    GABAPENTIN (NEURONTIN) 300 MG CAPSULE    Take 2 capsules by mouth 3 times daily    IBUPROFEN (ADVIL;MOTRIN) 400 MG TABLET    Take 1 tablet by mouth every 6 hours as needed for Pain    IPRATROPIUM-ALBUTEROL IN    Inhale 1 vial into the lungs 4 times daily as needed    LEVOTHYROXINE (SYNTHROID) 100 MCG TABLET    Take 1 tablet specified.     DISCHARGE MEDICATIONS:  New Prescriptions    No medications on file          (Please note that portions of this note were completed with a voice recognition program.  Efforts were made to edit the dictations but occasionally words are mis-transcribed.)    Katie Addison MD (electronically signed)  Attending Emergency Physician         Katie Addison MD  11/21/17 1998

## 2017-11-22 NOTE — ED NOTES
Spoke with YAKOV Kaur  from [facility], she feels that they are no longer able to care for the pt d/t her declining behavior over the past 6 months. They wish for the pt to be transferred to [facility]. MD notified.      Gil Zhao RN  11/21/17 2683

## 2017-11-22 NOTE — ED NOTES
[facility] to get involved in attempting to get the pt transferred back to [facility].       Ady Reese RN  11/21/17 9397

## 2017-11-22 NOTE — ED NOTES
Pt ambulated to bathroom and back to the room. Pt given food and water and tv put on.       Ashish Coe RN  11/22/17 3598

## 2018-01-23 ENCOUNTER — HOSPITAL ENCOUNTER (INPATIENT)
Age: 63
LOS: 2 days | Discharge: HOSPICE | DRG: 435 | End: 2018-01-25
Attending: FAMILY MEDICINE | Admitting: INTERNAL MEDICINE
Payer: MEDICARE

## 2018-01-23 DIAGNOSIS — D69.6 THROMBOCYTOPENIA (HCC): ICD-10-CM

## 2018-01-23 DIAGNOSIS — D64.9 ANEMIA, UNSPECIFIED TYPE: ICD-10-CM

## 2018-01-23 DIAGNOSIS — R53.1 GENERAL WEAKNESS: Primary | ICD-10-CM

## 2018-01-23 DIAGNOSIS — R79.89 LFT ELEVATION: ICD-10-CM

## 2018-01-23 LAB
ALBUMIN SERPL-MCNC: 2.6 G/DL (ref 3.5–5.2)
ALP BLD-CCNC: 390 U/L (ref 35–104)
ALT SERPL-CCNC: 93 U/L (ref 5–33)
AMYLASE: 10 U/L (ref 28–100)
ANION GAP SERPL CALCULATED.3IONS-SCNC: 12 MMOL/L (ref 7–19)
AST SERPL-CCNC: 378 U/L (ref 5–32)
BACTERIA: ABNORMAL /HPF
BASOPHILS ABSOLUTE: 0.1 K/UL (ref 0–0.2)
BASOPHILS RELATIVE PERCENT: 0.8 % (ref 0–1)
BILIRUB SERPL-MCNC: 1.6 MG/DL (ref 0.2–1.2)
BILIRUBIN URINE: NEGATIVE
BLOOD, URINE: ABNORMAL
BUN BLDV-MCNC: 14 MG/DL (ref 8–23)
CALCIUM IONIZED: 1.68 MMOL/L (ref 1.12–1.32)
CALCIUM SERPL-MCNC: 12 MG/DL (ref 8.8–10.2)
CHLORIDE BLD-SCNC: 104 MMOL/L (ref 98–111)
CLARITY: ABNORMAL
CO2: 27 MMOL/L (ref 22–29)
COLOR: YELLOW
CREAT SERPL-MCNC: 0.7 MG/DL (ref 0.5–0.9)
EOSINOPHILS ABSOLUTE: 0 K/UL (ref 0–0.6)
EOSINOPHILS RELATIVE PERCENT: 0.2 % (ref 0–5)
EPITHELIAL CELLS, UA: ABNORMAL /HPF
GFR NON-AFRICAN AMERICAN: >60
GLUCOSE BLD-MCNC: 109 MG/DL (ref 74–109)
GLUCOSE URINE: NEGATIVE MG/DL
HCT VFR BLD CALC: 25.3 % (ref 37–47)
HEMOGLOBIN: 8.2 G/DL (ref 12–16)
KETONES, URINE: NEGATIVE MG/DL
LEUKOCYTE ESTERASE, URINE: ABNORMAL
LIPASE: 34 U/L (ref 13–60)
LYMPHOCYTES ABSOLUTE: 4.7 K/UL (ref 1.1–4.5)
LYMPHOCYTES RELATIVE PERCENT: 35.8 % (ref 20–40)
MAGNESIUM: 2 MG/DL (ref 1.6–2.4)
MCH RBC QN AUTO: 29.8 PG (ref 27–31)
MCHC RBC AUTO-ENTMCNC: 32.4 G/DL (ref 33–37)
MCV RBC AUTO: 92 FL (ref 81–99)
MONOCYTES ABSOLUTE: 1.4 K/UL (ref 0–0.9)
MONOCYTES RELATIVE PERCENT: 11.1 % (ref 0–10)
NEUTROPHILS ABSOLUTE: 5.8 K/UL (ref 1.5–7.5)
NEUTROPHILS RELATIVE PERCENT: 44.6 % (ref 50–65)
NITRITE, URINE: NEGATIVE
PDW BLD-RTO: 16.8 % (ref 11.5–14.5)
PH UA: 6
PLATELET # BLD: 43 K/UL (ref 130–400)
PMV BLD AUTO: 12.3 FL (ref 9.4–12.3)
POTASSIUM REFLEX MAGNESIUM: 3.5 MMOL/L (ref 3.5–5)
PROTEIN UA: ABNORMAL MG/DL
RBC # BLD: 2.75 M/UL (ref 4.2–5.4)
RBC UA: ABNORMAL /HPF (ref 0–2)
SODIUM BLD-SCNC: 143 MMOL/L (ref 136–145)
SPECIFIC GRAVITY UA: 1.01
TOTAL PROTEIN: 6 G/DL (ref 6.6–8.7)
UROBILINOGEN, URINE: 1 E.U./DL
WBC # BLD: 13 K/UL (ref 4.8–10.8)
WBC UA: ABNORMAL /HPF (ref 0–5)

## 2018-01-23 PROCEDURE — 1210000000 HC MED SURG R&B

## 2018-01-23 PROCEDURE — 36415 COLL VENOUS BLD VENIPUNCTURE: CPT

## 2018-01-23 PROCEDURE — 2580000003 HC RX 258: Performed by: INTERNAL MEDICINE

## 2018-01-23 PROCEDURE — 2580000003 HC RX 258: Performed by: FAMILY MEDICINE

## 2018-01-23 PROCEDURE — 99285 EMERGENCY DEPT VISIT HI MDM: CPT | Performed by: FAMILY MEDICINE

## 2018-01-23 PROCEDURE — 83735 ASSAY OF MAGNESIUM: CPT

## 2018-01-23 PROCEDURE — 80053 COMPREHEN METABOLIC PANEL: CPT

## 2018-01-23 PROCEDURE — 83690 ASSAY OF LIPASE: CPT

## 2018-01-23 PROCEDURE — 99285 EMERGENCY DEPT VISIT HI MDM: CPT

## 2018-01-23 PROCEDURE — 81003 URINALYSIS AUTO W/O SCOPE: CPT

## 2018-01-23 PROCEDURE — 82330 ASSAY OF CALCIUM: CPT

## 2018-01-23 PROCEDURE — 82150 ASSAY OF AMYLASE: CPT

## 2018-01-23 PROCEDURE — 85025 COMPLETE CBC W/AUTO DIFF WBC: CPT

## 2018-01-23 RX ORDER — BISACODYL 10 MG
10 SUPPOSITORY, RECTAL RECTAL DAILY PRN
Status: DISCONTINUED | OUTPATIENT
Start: 2018-01-23 | End: 2018-01-25 | Stop reason: HOSPADM

## 2018-01-23 RX ORDER — SODIUM CHLORIDE 9 MG/ML
INJECTION, SOLUTION INTRAVENOUS CONTINUOUS
Status: DISCONTINUED | OUTPATIENT
Start: 2018-01-23 | End: 2018-01-25 | Stop reason: HOSPADM

## 2018-01-23 RX ORDER — ONDANSETRON 2 MG/ML
4 INJECTION INTRAMUSCULAR; INTRAVENOUS EVERY 6 HOURS PRN
Status: DISCONTINUED | OUTPATIENT
Start: 2018-01-23 | End: 2018-01-24

## 2018-01-23 RX ORDER — IBUPROFEN 400 MG/1
400 TABLET ORAL 4 TIMES DAILY
Status: ON HOLD | COMMUNITY
End: 2018-01-25 | Stop reason: HOSPADM

## 2018-01-23 RX ORDER — BENZTROPINE MESYLATE 0.5 MG/1
0.5 TABLET ORAL NIGHTLY
Status: ON HOLD | COMMUNITY
End: 2018-01-25

## 2018-01-23 RX ORDER — ACETAMINOPHEN 325 MG/1
650 TABLET ORAL EVERY 4 HOURS PRN
Status: DISCONTINUED | OUTPATIENT
Start: 2018-01-23 | End: 2018-01-25 | Stop reason: HOSPADM

## 2018-01-23 RX ORDER — ZIPRASIDONE HYDROCHLORIDE 20 MG/1
20 CAPSULE ORAL NIGHTLY
COMMUNITY

## 2018-01-23 RX ORDER — DOCUSATE SODIUM 100 MG/1
100 CAPSULE, LIQUID FILLED ORAL 2 TIMES DAILY
Status: DISCONTINUED | OUTPATIENT
Start: 2018-01-23 | End: 2018-01-25 | Stop reason: HOSPADM

## 2018-01-23 RX ORDER — 0.9 % SODIUM CHLORIDE 0.9 %
250 INTRAVENOUS SOLUTION INTRAVENOUS ONCE
Status: COMPLETED | OUTPATIENT
Start: 2018-01-23 | End: 2018-01-23

## 2018-01-23 RX ADMIN — SODIUM CHLORIDE 250 ML: 9 INJECTION, SOLUTION INTRAVENOUS at 20:35

## 2018-01-23 RX ADMIN — SODIUM CHLORIDE: 9 INJECTION, SOLUTION INTRAVENOUS at 23:39

## 2018-01-23 ASSESSMENT — ENCOUNTER SYMPTOMS
BACK PAIN: 0
COUGH: 0
ABDOMINAL PAIN: 0
SHORTNESS OF BREATH: 0
DIARRHEA: 0
SORE THROAT: 0
VOMITING: 0
ABDOMINAL DISTENTION: 0
WHEEZING: 0
CHEST TIGHTNESS: 0
APNEA: 0
CONSTIPATION: 0
TROUBLE SWALLOWING: 0

## 2018-01-23 NOTE — Clinical Note
Patient Class: Inpatient [101]   REQUIRED: Diagnosis: Anemia [530855]   Estimated Length of Stay: Estimated stay of more than 2 midnights   Future Attending Provider: Moises Whalen [[id number]]   Admitting Provider: Iveth Singh [[id number]]   Telemetry Bed Required?: Yes

## 2018-01-24 ENCOUNTER — APPOINTMENT (OUTPATIENT)
Dept: ULTRASOUND IMAGING | Age: 63
DRG: 435 | End: 2018-01-24
Payer: MEDICARE

## 2018-01-24 ENCOUNTER — APPOINTMENT (OUTPATIENT)
Dept: CT IMAGING | Age: 63
DRG: 435 | End: 2018-01-24
Payer: MEDICARE

## 2018-01-24 LAB
AMMONIA: 57 UMOL/L (ref 11–51)
ANION GAP SERPL CALCULATED.3IONS-SCNC: 13 MMOL/L (ref 7–19)
ANISOCYTOSIS: ABNORMAL
ANTIBODY SCREEN: NORMAL
BANDED NEUTROPHILS RELATIVE PERCENT: 12 % (ref 0–5)
BASOPHILS ABSOLUTE: 0.3 K/UL (ref 0–0.2)
BASOPHILS MANUAL: 2 %
BASOPHILS RELATIVE PERCENT: 2 % (ref 0–1)
BUN BLDV-MCNC: 14 MG/DL (ref 8–23)
CA 19-9: 138 U/ML (ref 0–35)
CALCIUM SERPL-MCNC: 12.6 MG/DL (ref 8.8–10.2)
CEA: 8.6 NG/ML (ref 0–4.7)
CHLORIDE BLD-SCNC: 105 MMOL/L (ref 98–111)
CO2: 26 MMOL/L (ref 22–29)
CREAT SERPL-MCNC: 0.5 MG/DL (ref 0.5–0.9)
DAT POLYSPECIFIC: NORMAL
EOSINOPHILS ABSOLUTE: 0.15 K/UL (ref 0–0.6)
EOSINOPHILS RELATIVE PERCENT: 1 % (ref 0–5)
FERRITIN: >2000 NG/ML (ref 13–150)
FOLATE: >20 NG/ML (ref 4.8–37.3)
GFR NON-AFRICAN AMERICAN: >60
GLUCOSE BLD-MCNC: 81 MG/DL (ref 74–109)
HAPTOGLOBIN: 67 MG/DL (ref 30–200)
HCT VFR BLD CALC: 25 % (ref 37–47)
HCT VFR BLD CALC: 25.3 % (ref 37–47)
HEMOGLOBIN: 8.1 G/DL (ref 12–16)
HEMOGLOBIN: 8.1 G/DL (ref 12–16)
IRON SATURATION: 101 % (ref 14–50)
IRON: 255 UG/DL (ref 37–145)
LACTATE DEHYDROGENASE: 765 U/L (ref 91–215)
LACTIC ACID: 1.3 MMOL/L (ref 0.5–1.9)
LV EF: 58 %
LVEF MODALITY: NORMAL
LYMPHOCYTES ABSOLUTE: 4.7 K/UL (ref 1.1–4.5)
LYMPHOCYTES RELATIVE PERCENT: 31 % (ref 20–40)
MAGNESIUM: 1.9 MG/DL (ref 1.6–2.4)
MCH RBC QN AUTO: 29.9 PG (ref 27–31)
MCHC RBC AUTO-ENTMCNC: 32.4 G/DL (ref 33–37)
MCV RBC AUTO: 92.3 FL (ref 81–99)
METAMYELOCYTES RELATIVE PERCENT: 1 %
MICROCYTES: ABNORMAL
MONOCYTES ABSOLUTE: 0.6 K/UL (ref 0–0.9)
MONOCYTES RELATIVE PERCENT: 4 % (ref 0–10)
MYELOCYTE PERCENT: 2 %
NEUTROPHILS ABSOLUTE: 9.5 K/UL (ref 1.5–7.5)
NEUTROPHILS MANUAL: 47 %
NEUTROPHILS RELATIVE PERCENT: 47 % (ref 50–65)
PARATHYROID HORMONE INTACT: 11 PG/ML (ref 15–65)
PDW BLD-RTO: 17 % (ref 11.5–14.5)
PHOSPHORUS: 3.6 MG/DL (ref 2.5–4.5)
PLATELET # BLD: 46 K/UL (ref 130–400)
PLATELET SLIDE REVIEW: ABNORMAL
PMV BLD AUTO: 12.7 FL (ref 9.4–12.3)
POTASSIUM REFLEX MAGNESIUM: 3.5 MMOL/L (ref 3.5–5)
RBC # BLD: 2.71 M/UL (ref 4.2–5.4)
RETICULOCYTE ABSOLUTE COUNT: 0.06 M/UL (ref 0.03–0.12)
RETICULOCYTE COUNT PCT: 2.2 % (ref 0.5–1.5)
SCHISTOCYTES: ABNORMAL
SODIUM BLD-SCNC: 144 MMOL/L (ref 136–145)
TOTAL IRON BINDING CAPACITY: 253 UG/DL (ref 250–400)
VITAMIN B-12: >2000 PG/ML (ref 211–946)
VITAMIN D 25-HYDROXY: 16 NG/ML
WBC # BLD: 15.3 K/UL (ref 4.8–10.8)

## 2018-01-24 PROCEDURE — C9113 INJ PANTOPRAZOLE SODIUM, VIA: HCPCS | Performed by: INTERNAL MEDICINE

## 2018-01-24 PROCEDURE — 82378 CARCINOEMBRYONIC ANTIGEN: CPT

## 2018-01-24 PROCEDURE — 84100 ASSAY OF PHOSPHORUS: CPT

## 2018-01-24 PROCEDURE — 6370000000 HC RX 637 (ALT 250 FOR IP): Performed by: INTERNAL MEDICINE

## 2018-01-24 PROCEDURE — 6370000000 HC RX 637 (ALT 250 FOR IP): Performed by: NURSE PRACTITIONER

## 2018-01-24 PROCEDURE — 87040 BLOOD CULTURE FOR BACTERIA: CPT

## 2018-01-24 PROCEDURE — 82607 VITAMIN B-12: CPT

## 2018-01-24 PROCEDURE — 36415 COLL VENOUS BLD VENIPUNCTURE: CPT

## 2018-01-24 PROCEDURE — 82728 ASSAY OF FERRITIN: CPT

## 2018-01-24 PROCEDURE — 6360000004 HC RX CONTRAST MEDICATION: Performed by: INTERNAL MEDICINE

## 2018-01-24 PROCEDURE — 84165 PROTEIN E-PHORESIS SERUM: CPT

## 2018-01-24 PROCEDURE — 83540 ASSAY OF IRON: CPT

## 2018-01-24 PROCEDURE — 6360000002 HC RX W HCPCS: Performed by: INTERNAL MEDICINE

## 2018-01-24 PROCEDURE — 85014 HEMATOCRIT: CPT

## 2018-01-24 PROCEDURE — 83550 IRON BINDING TEST: CPT

## 2018-01-24 PROCEDURE — 74177 CT ABD & PELVIS W/CONTRAST: CPT

## 2018-01-24 PROCEDURE — 80048 BASIC METABOLIC PNL TOTAL CA: CPT

## 2018-01-24 PROCEDURE — 83735 ASSAY OF MAGNESIUM: CPT

## 2018-01-24 PROCEDURE — 86301 IMMUNOASSAY TUMOR CA 19-9: CPT

## 2018-01-24 PROCEDURE — 82306 VITAMIN D 25 HYDROXY: CPT

## 2018-01-24 PROCEDURE — 87086 URINE CULTURE/COLONY COUNT: CPT

## 2018-01-24 PROCEDURE — 82746 ASSAY OF FOLIC ACID SERUM: CPT

## 2018-01-24 PROCEDURE — 83010 ASSAY OF HAPTOGLOBIN QUANT: CPT

## 2018-01-24 PROCEDURE — 85018 HEMOGLOBIN: CPT

## 2018-01-24 PROCEDURE — 83605 ASSAY OF LACTIC ACID: CPT

## 2018-01-24 PROCEDURE — 85045 AUTOMATED RETICULOCYTE COUNT: CPT

## 2018-01-24 PROCEDURE — 83883 ASSAY NEPHELOMETRY NOT SPEC: CPT

## 2018-01-24 PROCEDURE — 83615 LACTATE (LD) (LDH) ENZYME: CPT

## 2018-01-24 PROCEDURE — 83970 ASSAY OF PARATHORMONE: CPT

## 2018-01-24 PROCEDURE — 85025 COMPLETE CBC W/AUTO DIFF WBC: CPT

## 2018-01-24 PROCEDURE — 76705 ECHO EXAM OF ABDOMEN: CPT

## 2018-01-24 PROCEDURE — 99223 1ST HOSP IP/OBS HIGH 75: CPT | Performed by: INTERNAL MEDICINE

## 2018-01-24 PROCEDURE — 84160 ASSAY OF PROTEIN ANY SOURCE: CPT

## 2018-01-24 PROCEDURE — 82784 ASSAY IGA/IGD/IGG/IGM EACH: CPT

## 2018-01-24 PROCEDURE — 93306 TTE W/DOPPLER COMPLETE: CPT

## 2018-01-24 PROCEDURE — 2580000003 HC RX 258: Performed by: INTERNAL MEDICINE

## 2018-01-24 PROCEDURE — 51702 INSERT TEMP BLADDER CATH: CPT

## 2018-01-24 PROCEDURE — 86880 COOMBS TEST DIRECT: CPT

## 2018-01-24 PROCEDURE — 1210000000 HC MED SURG R&B

## 2018-01-24 PROCEDURE — 87186 SC STD MICRODIL/AGAR DIL: CPT

## 2018-01-24 PROCEDURE — 86850 RBC ANTIBODY SCREEN: CPT

## 2018-01-24 PROCEDURE — 82140 ASSAY OF AMMONIA: CPT

## 2018-01-24 RX ORDER — ZIPRASIDONE HYDROCHLORIDE 20 MG/1
20 CAPSULE ORAL NIGHTLY
Status: DISCONTINUED | OUTPATIENT
Start: 2018-01-24 | End: 2018-01-25 | Stop reason: HOSPADM

## 2018-01-24 RX ORDER — PANTOPRAZOLE SODIUM 40 MG/10ML
40 INJECTION, POWDER, LYOPHILIZED, FOR SOLUTION INTRAVENOUS 2 TIMES DAILY
Status: DISCONTINUED | OUTPATIENT
Start: 2018-01-24 | End: 2018-01-25 | Stop reason: HOSPADM

## 2018-01-24 RX ORDER — GABAPENTIN 600 MG/1
600 TABLET ORAL 3 TIMES DAILY
Status: DISCONTINUED | OUTPATIENT
Start: 2018-01-24 | End: 2018-01-25 | Stop reason: HOSPADM

## 2018-01-24 RX ORDER — ALENDRONATE SODIUM 70 MG/1
70 TABLET ORAL
Status: DISCONTINUED | OUTPATIENT
Start: 2018-01-24 | End: 2018-01-24

## 2018-01-24 RX ORDER — RISPERIDONE 0.5 MG/1
0.5 TABLET, FILM COATED ORAL 2 TIMES DAILY
Status: DISCONTINUED | OUTPATIENT
Start: 2018-01-24 | End: 2018-01-25 | Stop reason: HOSPADM

## 2018-01-24 RX ORDER — ATORVASTATIN CALCIUM 40 MG/1
40 TABLET, FILM COATED ORAL DAILY
Status: DISCONTINUED | OUTPATIENT
Start: 2018-01-24 | End: 2018-01-25 | Stop reason: HOSPADM

## 2018-01-24 RX ORDER — BENZTROPINE MESYLATE 1 MG/1
0.5 TABLET ORAL NIGHTLY
Status: DISCONTINUED | OUTPATIENT
Start: 2018-01-24 | End: 2018-01-25 | Stop reason: HOSPADM

## 2018-01-24 RX ORDER — FOLIC ACID 1 MG/1
1 TABLET ORAL DAILY
Status: DISCONTINUED | OUTPATIENT
Start: 2018-01-24 | End: 2018-01-25 | Stop reason: HOSPADM

## 2018-01-24 RX ADMIN — GABAPENTIN 600 MG: 600 TABLET, FILM COATED ORAL at 09:11

## 2018-01-24 RX ADMIN — ZIPRASIDONE HYDROCHLORIDE 20 MG: 20 CAPSULE ORAL at 20:33

## 2018-01-24 RX ADMIN — PANTOPRAZOLE SODIUM 40 MG: 40 INJECTION, POWDER, FOR SOLUTION INTRAVENOUS at 09:10

## 2018-01-24 RX ADMIN — RISPERIDONE 0.5 MG: 0.5 TABLET, FILM COATED ORAL at 20:34

## 2018-01-24 RX ADMIN — PANTOPRAZOLE SODIUM 40 MG: 40 INJECTION, POWDER, FOR SOLUTION INTRAVENOUS at 20:34

## 2018-01-24 RX ADMIN — RISPERIDONE 0.5 MG: 0.5 TABLET, FILM COATED ORAL at 09:11

## 2018-01-24 RX ADMIN — GABAPENTIN 600 MG: 600 TABLET, FILM COATED ORAL at 20:33

## 2018-01-24 RX ADMIN — SERTRALINE HYDROCHLORIDE 100 MG: 50 TABLET ORAL at 18:47

## 2018-01-24 RX ADMIN — LEVOTHYROXINE SODIUM 137 MCG: 25 TABLET ORAL at 18:47

## 2018-01-24 RX ADMIN — Medication 1 G: at 09:10

## 2018-01-24 RX ADMIN — ACETAMINOPHEN 650 MG: 325 TABLET, FILM COATED ORAL at 09:17

## 2018-01-24 RX ADMIN — IOPAMIDOL 90 ML: 755 INJECTION, SOLUTION INTRAVENOUS at 06:35

## 2018-01-24 RX ADMIN — FOLIC ACID 1 MG: 1 TABLET ORAL at 09:11

## 2018-01-24 RX ADMIN — DOCUSATE SODIUM 100 MG: 100 CAPSULE, LIQUID FILLED ORAL at 20:34

## 2018-01-24 RX ADMIN — DOCUSATE SODIUM 100 MG: 100 CAPSULE, LIQUID FILLED ORAL at 09:11

## 2018-01-24 RX ADMIN — BENZTROPINE MESYLATE 0.5 MG: 1 TABLET ORAL at 20:34

## 2018-01-24 ASSESSMENT — PAIN DESCRIPTION - DIRECTION: RADIATING_TOWARDS: UTA

## 2018-01-24 ASSESSMENT — PAIN DESCRIPTION - PROGRESSION: CLINICAL_PROGRESSION: NOT CHANGED

## 2018-01-24 ASSESSMENT — ACTIVITIES OF DAILY LIVING (ADL): EFFECT OF PAIN ON DAILY ACTIVITIES: UNALBE TO ASSESS

## 2018-01-24 ASSESSMENT — PAIN DESCRIPTION - LOCATION: LOCATION: ABDOMEN

## 2018-01-24 ASSESSMENT — PAIN SCALES - GENERAL
PAINLEVEL_OUTOF10: 2
PAINLEVEL_OUTOF10: 5

## 2018-01-24 ASSESSMENT — PAIN DESCRIPTION - PAIN TYPE: TYPE: OTHER (COMMENT)

## 2018-01-24 ASSESSMENT — PAIN DESCRIPTION - ORIENTATION: ORIENTATION: OTHER (COMMENT)

## 2018-01-24 ASSESSMENT — PAIN DESCRIPTION - DESCRIPTORS: DESCRIPTORS: PATIENT UNABLE TO DESCRIBE

## 2018-01-24 ASSESSMENT — PAIN DESCRIPTION - ONSET: ONSET: UNABLE TO ASSESS

## 2018-01-24 ASSESSMENT — PAIN DESCRIPTION - FREQUENCY: FREQUENCY: OTHER (COMMENT)

## 2018-01-24 NOTE — ED PROVIDER NOTES
[facility]  eMERGENCY dEPARTMENT eNCOUnter      Pt Name: Kodi Blanco  MRN: [id number]  [place] 1955  Date of evaluation: 1/23/2018  Provider: Jackie Oconnor MD    CHIEF COMPLAINT       Chief Complaint   Patient presents with    Other     pt sent to ED from Nursing home for abnormal labs. HISTORY OF PRESENT ILLNESS   (Location/Symptom, Timing/Onset, Context/Setting, Quality, Duration, Modifying Factors, Severity)  Note limiting factors. Kodi Blanco is a 65 y.o. female who presents to the emergency department . Patient presents with weakness and slight confusion. He is also report of dark stools ×2 days. Patient denies any specific complaints or pain except for the weakness. She denies chest pain or shortness of breath. HPI    Nursing Notes were reviewed. REVIEW OF SYSTEMS    (2-9 systems for level 4, 10 or more for level 5)     Review of Systems   Constitutional: Positive for fatigue. Negative for diaphoresis and fever. HENT: Negative for sore throat and trouble swallowing. Eyes: Negative for visual disturbance. Respiratory: Negative for apnea, cough, chest tightness, shortness of breath and wheezing. Cardiovascular: Negative for chest pain, palpitations and leg swelling. Gastrointestinal: Negative for abdominal distention, abdominal pain, constipation, diarrhea and vomiting. Musculoskeletal: Negative for back pain and myalgias. Skin: Positive for pallor. Neurological: Positive for weakness. Negative for dizziness, light-headedness and headaches. Hematological: Bruises/bleeds easily. Psychiatric/Behavioral: Negative for behavioral problems and suicidal ideas. All other systems reviewed and are negative.            PAST MEDICAL HISTORY     Past Medical History:   Diagnosis Date    Chronic back pain     [facility]ch[facility] obstructive [facility]) ([facility])     Depression     Fetal alcohol syndrome     Fibromyalgia     GERD (gastroesophageal reflux disease)     Hearing loss     Hyperlipidemia     Hypothyroidism     Neck pain     states has pinched nerve in neck    Palliative care encounter          SURGICAL HISTORY       Past Surgical History:   Procedure Laterality Date    BREAST SURGERY      right biopsy benign    COCHLEAR IMPLANT      COLONOSCOPY      FRACTURE SURGERY      right hip fracture    HYSTERECTOMY      UPPER GASTROINTESTINAL ENDOSCOPY           CURRENT MEDICATIONS       Previous Medications    ACETAMINOPHEN (TYLENOL) 325 MG TABLET    Take 2 tablets by mouth every 4 hours as needed for Pain    ALBUTEROL (PROVENTIL) (2.5 MG/3ML) 0.083% NEBULIZER SOLUTION    Take 2.5 mg by nebulization 4 times daily    ALBUTEROL SULFATE  (90 BASE) MCG/ACT INHALER    Inhale 2 puffs into the lungs 4 times daily As needed for shortness of air    ALENDRONATE (FOSAMAX) 70 MG TABLET    Take 70 mg by mouth every 7 days mondays    ATORVASTATIN (LIPITOR) 40 MG TABLET    TAKE ONE TABLET BY MOUTH EVERY DAY    CHOLECALCIFEROL (VITAMIN D3) 5000 UNITS TABS    Take by mouth    CLONAZEPAM (KLONOPIN) 0.5 MG TABLET    Take 0.5 mg by mouth See Admin Instructions Take 1 tablet every morning, 1 tablet at 12:00 PM, and 2 tablets (1MG) at bedtime.     CYCLOBENZAPRINE (FLEXERIL) 10 MG TABLET    Take 10 mg by mouth daily At 2000    DIVALPROEX (DEPAKOTE) 500 MG DR TABLET    Take 1 tablet by mouth nightly    DULOXETINE (CYMBALTA) 60 MG CAPSULE    Take 90 mg by mouth daily Takes a total of 90mg daily (one 60mg tablet and one 30mg tablet)    FENOFIBRATE (TRICOR) 145 MG TABLET    Take 145 mg by mouth daily    FLUTICASONE (FLONASE) 50 MCG/ACT NASAL SPRAY    2 sprays by Nasal route daily     FOLIC ACID (FOLVITE) 1 MG TABLET    TAKE ONE TABLET BY MOUTH EVERY DAY    GABAPENTIN (NEURONTIN) 300 MG CAPSULE    Take 2 capsules by mouth 3 times daily    IBUPROFEN (ADVIL;MOTRIN) 400 MG TABLET    Take 1 tablet by mouth every 6 hours as needed for Pain    IPRATROPIUM-ALBUTEROL IN    Inhale 1 none     Procedures    FINAL IMPRESSION      1. General weakness    2. Anemia, unspecified type    3. Thrombocytopenia ([address]    4.  LFT elevation          DISPOSITION/PLAN   DISPOSITION Decision To Admit 01/23/2018 09:00:31 PM      PATIENT REFERRED TO:  Ming Bush MD  [address] [address]  [phone]            DISCHARGE MEDICATIONS:  New Prescriptions    No medications on file          (Please note that portions of this note were completed with a voice recognition program.  Efforts were made to edit the dictations but occasionally words are mis-transcribed.)    Lisandro Gamino MD (electronically signed)  Attending Emergency Physician          Lisandro Gamino MD  01/23/18 4510

## 2018-01-24 NOTE — H&P
MG capsule, Take 2 capsules by mouth 3 times daily  levothyroxine (SYNTHROID) 100 MCG tablet, Take 1 tablet by mouth Daily (Patient taking differently: Take 137 mcg by mouth Daily )  fluticasone (FLONASE) 50 MCG/ACT nasal spray, 2 sprays by Nasal route daily   cyclobenzaprine (FLEXERIL) 10 MG tablet, Take 10 mg by mouth daily At 2000  atorvastatin (LIPITOR) 40 MG tablet, TAKE ONE TABLET BY MOUTH EVERY DAY  folic acid (FOLVITE) 1 MG tablet, TAKE ONE TABLET BY MOUTH EVERY DAY  alendronate (FOSAMAX) 70 MG tablet, Take 70 mg by mouth every 7 days mondays    Allergies:  Lyrica [pregabalin] and Savella [milnacipran hcl]    Social History:   TOBACCO:   reports that she has been smoking Cigarettes. She has been smoking about 2.00 packs per day. She has never used smokeless tobacco.  ETOH:   reports that she does not drink alcohol. Patient currently lives in a nursing home    Family History:       Problem Relation Age of Onset    Cancer Mother     Cancer Father        I have personally reviewed above histories  REVIEW OF SYSTEMS:  Review of Systems   Unable to perform ROS: Mental acuity     PHYSICAL EXAM:  /70   Pulse 88   Temp 98.2 °F (36.8 °C) (Temporal)   Resp 16   Ht 5' 2\" (1.575 m)   Wt 150 lb (68 kg)   SpO2 97%   BMI 27.44 kg/m²   Physical Exam   Constitutional:  Non-toxic appearance. She does not have a sickly appearance. She appears ill. No distress. HENT:   Head: Normocephalic and atraumatic. Eyes: Conjunctivae and lids are normal. Pupils are equal, round, and reactive to light. No scleral icterus. Neck: No JVD present. Carotid bruit is not present. Cardiovascular: Normal rate and regular rhythm. Exam reveals no S3. No murmur heard. Pulses:       Carotid pulses are 2+ on the right side, and 2+ on the left side. Radial pulses are 2+ on the right side, and 2+ on the left side. Pulmonary/Chest: She has no decreased breath sounds. She has no wheezes. She has no rhonchi.  She has no components within normal limits    Narrative:     CALL  [place]  [facility] tel. , karol Mendosa, 01/23/2018 19:37, by [address] - Abnormal; Notable for the following:     WBC, UA TNTC (*)     RBC, UA 6-10 (*)     All other components within normal limits   HEMOGLOBIN AND HEMATOCRIT, BLOOD - Abnormal; Notable for the following:     Hemoglobin 8.1 (*)     Hematocrit 25.3 (*)     All other components within normal limits   URINE CULTURE   CULTURE BLOOD #1   CULTURE BLOOD #2   LIPASE   MAGNESIUM   CBC WITH AUTO DIFFERENTIAL   BASIC METABOLIC PANEL W/ REFLEX TO MG FOR LOW K           IMPRESSION:    1. Anemia thrombocytopenia  2. Abdominal pain  3. ? GI bleed  4. Hypercalcemia  5. Transaminitis  6. Urinary tract infection  7. Depression  8. Decubitus ulcer buttocks    PLAN:     1. Admit to the hospitalist service  2. I will get a CT scan of the abdomen, is not enough information will follow with a liver ultrasound  3. Anemia workup  4. Protonix twice a day  5. GI consult  6. ceftriazone and follow urine cultures  7. Review home medications  8. Check echo  9. Wound care consult  10.  Patient incontinent will need esqueda    Nancy Gupta MD    Internal Medicine Hospitalist

## 2018-01-25 VITALS
WEIGHT: 136.44 LBS | TEMPERATURE: 99.4 F | HEART RATE: 123 BPM | BODY MASS INDEX: 25.11 KG/M2 | HEIGHT: 62 IN | RESPIRATION RATE: 16 BRPM | SYSTOLIC BLOOD PRESSURE: 124 MMHG | OXYGEN SATURATION: 95 % | DIASTOLIC BLOOD PRESSURE: 83 MMHG

## 2018-01-25 PROBLEM — M89.9 BONE LESION: Status: ACTIVE | Noted: 2018-01-25

## 2018-01-25 PROBLEM — E03.4 HYPOTHYROIDISM DUE TO ACQUIRED ATROPHY OF THYROID: Chronic | Status: ACTIVE | Noted: 2017-08-30

## 2018-01-25 PROBLEM — K76.9 HEPATIC LESION: Status: ACTIVE | Noted: 2018-01-25

## 2018-01-25 PROBLEM — I95.9 HYPOTENSION: Status: RESOLVED | Noted: 2017-09-01 | Resolved: 2018-01-25

## 2018-01-25 PROBLEM — R82.81 PYURIA: Status: RESOLVED | Noted: 2017-08-30 | Resolved: 2018-01-25

## 2018-01-25 PROBLEM — J18.9 PNEUMONIA OF LEFT LOWER LOBE DUE TO INFECTIOUS ORGANISM: Status: RESOLVED | Noted: 2017-08-30 | Resolved: 2018-01-25

## 2018-01-25 PROCEDURE — 6370000000 HC RX 637 (ALT 250 FOR IP): Performed by: INTERNAL MEDICINE

## 2018-01-25 PROCEDURE — 99239 HOSP IP/OBS DSCHRG MGMT >30: CPT | Performed by: FAMILY MEDICINE

## 2018-01-25 PROCEDURE — 6360000002 HC RX W HCPCS: Performed by: INTERNAL MEDICINE

## 2018-01-25 PROCEDURE — 2580000003 HC RX 258: Performed by: INTERNAL MEDICINE

## 2018-01-25 PROCEDURE — C9113 INJ PANTOPRAZOLE SODIUM, VIA: HCPCS | Performed by: INTERNAL MEDICINE

## 2018-01-25 PROCEDURE — 6370000000 HC RX 637 (ALT 250 FOR IP): Performed by: NURSE PRACTITIONER

## 2018-01-25 RX ORDER — LACTULOSE 10 G/15ML
20 SOLUTION ORAL 3 TIMES DAILY
Refills: 1 | DISCHARGE
Start: 2018-01-25

## 2018-01-25 RX ORDER — DIVALPROEX SODIUM 500 MG/1
500 TABLET, DELAYED RELEASE ORAL NIGHTLY
Qty: 90 TABLET | Refills: 0 | DISCHARGE
Start: 2018-01-25

## 2018-01-25 RX ORDER — BENZTROPINE MESYLATE 0.5 MG/1
0.5 TABLET ORAL NIGHTLY
Qty: 60 TABLET | Refills: 3 | Status: SHIPPED | OUTPATIENT
Start: 2018-01-25

## 2018-01-25 RX ORDER — GABAPENTIN 300 MG/1
600 CAPSULE ORAL 3 TIMES DAILY
Qty: 60 CAPSULE | Refills: 0 | Status: SHIPPED | OUTPATIENT
Start: 2018-01-25 | End: 2018-02-04

## 2018-01-25 RX ORDER — MORPHINE SULFATE 20 MG/ML
2.5 SOLUTION ORAL EVERY 4 HOURS PRN
Qty: 2.25 ML | Refills: 0 | Status: CANCELLED | DISCHARGE
Start: 2018-01-25 | End: 2018-01-28

## 2018-01-25 RX ORDER — LACTULOSE 10 G/15ML
20 SOLUTION ORAL 3 TIMES DAILY
Status: DISCONTINUED | OUTPATIENT
Start: 2018-01-25 | End: 2018-01-25 | Stop reason: HOSPADM

## 2018-01-25 RX ORDER — MORPHINE SULFATE 20 MG/ML
2.5 SOLUTION ORAL EVERY 4 HOURS PRN
Status: DISCONTINUED | OUTPATIENT
Start: 2018-01-25 | End: 2018-01-25 | Stop reason: HOSPADM

## 2018-01-25 RX ORDER — MORPHINE SULFATE 20 MG/ML
2.5 SOLUTION ORAL EVERY 4 HOURS PRN
Qty: 1 BOTTLE | Refills: 0 | Status: SHIPPED | OUTPATIENT
Start: 2018-01-25 | End: 2018-02-24

## 2018-01-25 RX ADMIN — Medication 1 G: at 12:32

## 2018-01-25 RX ADMIN — PANTOPRAZOLE SODIUM 40 MG: 40 INJECTION, POWDER, FOR SOLUTION INTRAVENOUS at 12:31

## 2018-01-25 RX ADMIN — Medication 2.5 MG: at 13:45

## 2018-01-25 ASSESSMENT — PAIN SCALES - GENERAL: PAINLEVEL_OUTOF10: 7

## 2018-01-25 NOTE — DISCHARGE SUMMARY
Discharge Summary    Patient ID: Kassy Denton      Patient's PCP: Dr. Cari Sanchez M.D., MD    Admit Date: 1/23/2018     Discharge Date:   01/25/2018    Admitting Physician: Nakia Pete MD     Discharge Physician: Dr. Radha Sharma    Consultants:   1. Dr. Dana Hagan, Gastroenterology  2. Dr. Ceasar Saxena, Oncology    Discharge Diagnoses:    Principal Problem:    Anemia, Likely 2' to Metastatic Disease     Active Problems:    Hepatic/Bone lesions - Likely Metastatic Disease     Hypothyroidism due to acquired atrophy of thyroid - On Replacement    Gastroesophageal reflux disease without esophagitis - PPI            Hospital Course:   Ms. Juan Maher is a 65year old female who presented to the ER from the nursing home due to weakness and altered mental status and abnormal labs.      Work-up in the ER she was found to have Hgb 8.2 and PLT 43K. LFT were elevated. CT abdomen revealed liver lesions, suspected of metastatic disease, along with osseous metastatic disease, left-sided hydronephrosis. She was admitted to the hospitalist service with GI consultation.     She was seen by Dr. Ken Simon, GI, who recommended oncology for possible bone marrow biopsy. US liver demonstrated diffuse metastatic disease to the liver. Dr. Darryl Pruitt, Oncology, who recommended palliative care and discussion with family on course of treatment.     Family wished for hospice and patient to return to nursing home.          Exam:   Vital Signs: /83   Pulse 123   Temp 99.4 °F (37.4 °C) (Temporal)   Resp 16   Ht 5' 2\" (1.575 m)   Wt 136 lb 7 oz (61.9 kg)   SpO2 95%   BMI 24.95 kg/m²   General appearance: alert and cooperative with exam  HEENT: atraumatic, eyes with clear conjunctiva and normal lids, pupils and irises normal, external ears normal, lips normal.  Neck without masses, lympadenopathy, bruit, thyroid normal  Lungs: no increased work of breathing, clear to auscultation bilaterally  Heart:

## 2018-01-25 NOTE — PROGRESS NOTES
Hospitalist Progress Note  1/25/2018 11:15 AM  Subjective:   Admit Date: 1/23/2018  PCP: Dr. Amrita Lua M.D., MD    Chief Complaint: F/U Anemia, Liver and Bone Lesions    Subjective: Unable to carry on conversation. Cumulative Hospital Course:  Ms. Surinder Schumacher is a 65year old female who presented to the ER from the nursing home due to weakness and altered mental status and abnormal labs. Work-up in the ER she was found to have Hgb 8.2 and PLT 43K. LFT were elevated. CT abdomen revealed liver lesions, suspected of metastatic disease, along with osseous metastatic disease, left-sided hydronephrosis. She was admitted to the hospitalist service with GI consultation. She was seen by Dr. Han Newsome, GI, who recommended oncology for possible bone marrow biopsy. US liver demonstrated diffuse metastatic disease to the liver. Dr. Varghese Lai, Oncology, who recommended palliative care and discussion with family on course of treatment. Family wished for hospice and patient to return to nursing home. Review of Systems: Unable to obtain due to patient's mental status. DIET CARDIAC;     Intake/Output Summary (Last 24 hours) at 01/25/18 1115  Last data filed at 01/25/18 2025   Gross per 24 hour   Intake             4326 ml   Output             2900 ml   Net             1426 ml     Medications:   sodium chloride 125 mL/hr at 01/24/18 0271     Current Facility-Administered Medications   Medication Dose Route Frequency Provider Last Rate Last Dose    lactulose (CHRONULAC) 10 GM/15ML solution 20 g  20 g Oral TID Nemesio Rice MD        pamidronate (AREDIA) 90 mg in sodium chloride 0.9 % 500 mL infusion  90 mg Intravenous Once AMIRA Hull        cefTRIAXone (ROCEPHIN) 1 g in sodium chloride (PF) 10 mL IV syringe  1 g Intravenous Q24H Marlon Dewitt MD   1 g at 01/24/18 0910    pantoprazole (PROTONIX) injection 40 mg  40 mg Intravenous BID Bria Duron MD   40 mg at 01/24/18 2034    TSH 18.650 11/21/2017     CT Abdomen Pelvis with IV Contrast:  1. Suspected metastatic disease of the liver. The lesions are poorly defined which may partly be due to patient motion and artifact. 2. Several small hepatic cysts. 3. Evidence of left-sided hydronephrosis with abnormal thickening and enhancement of the left ureter which may represent an inflammatory or  neoplastic process. This needs to be further evaluated. 4. Evidence of extensive osseous metastatic disease involving the thoracolumbar spine, the ribs, the pelvis and proximal femur bilaterally. 5. Moderate thickening of the wall and mucosa of the ascending colon is  probably due to poor distention. Possibility of colitis or some other significant abnormality is not excluded. 6. Evidence of small bibasilar pleural effusion, more on the right side. US Liver:  1. Diffuse metastatic disease to the liver. 2. Too numerous to count small hypoechoic solid lesions are present  throughout the liver. None of these are more than a centimeter in size. 2. There is a benign hepatic cyst within the left lobe of liver. 3. There is sludge within the gallbladder with mild thickening of the  gallbladder wall. No biliary dilatation is present.     Objective:   Vitals: /78   Pulse 135   Temp 98.6 °F (37 °C) (Temporal)   Resp 16   Ht 5' 2\" (1.575 m)   Wt 136 lb 7 oz (61.9 kg)   SpO2 95%   BMI 24.95 kg/m²   24HR INTAKE/OUTPUT:      Intake/Output Summary (Last 24 hours) at 01/25/18 1115  Last data filed at 01/25/18 6871   Gross per 24 hour   Intake             4326 ml   Output             2900 ml   Net             1426 ml     General appearance: alert and cooperative with exam  HEENT: atraumatic, eyes with clear conjunctiva and normal lids, pupils and irises normal, external ears normal, lips normal.  Neck without masses, lympadenopathy, bruit, thyroid normal  Lungs: no increased work of breathing, clear to auscultation bilaterally  Heart: regular rate

## 2018-01-25 NOTE — PROGRESS NOTES
Met with the pt and her dtr to discuss hospice services. The pt is unable to respond. The dtr states she understands what hospice is and does want the pt to receive hospice services when she returns to the facility. She states she would like for the pt to return today if possible because the pt is more comfortable there and they live 5 min from the facility. It is ok to dc the pt is medically ready. Hospice will meet the pt and dtr at the facility to adm the pt to hospice.

## 2018-01-27 LAB
ALBUMIN SERPL-MCNC: 2.54 G/DL (ref 3.75–5.01)
ALPHA-1-GLOBULIN: 0.54 G/DL (ref 0.19–0.46)
ALPHA-2-GLOBULIN: 0.8 G/DL (ref 0.48–1.05)
BETA GLOBULIN: 0.77 G/DL (ref 0.48–1.1)
GAMMA GLOBULIN: 1.05 G/DL (ref 0.62–1.51)
IGA: 324 MG/DL (ref 68–408)
IGG: 1040 MG/DL (ref 768–1632)
IGM: 153 MG/DL (ref 35–263)
IMMUNOFIXATION REFLEX: ABNORMAL
KAPPA FREE LIGHT CHAINS QNT: 2.6 MG/DL (ref 0.33–1.94)
KAPPA/LAMBDA FREE LIGHT CHAIN RATIO: 0.85 (ref 0.26–1.65)
LAMBDA FREE LIGHT CHAINS QNT: 3.05 MG/DL (ref 0.57–2.63)
ORGANISM: ABNORMAL
SPE/IFE INTERPRETATION: ABNORMAL
TOTAL PROTEIN: 5.7 G/DL (ref 6–8.3)
URINE CULTURE, ROUTINE: ABNORMAL
URINE CULTURE, ROUTINE: ABNORMAL

## 2018-01-29 LAB
BLOOD CULTURE, ROUTINE: NORMAL
CULTURE, BLOOD 2: NORMAL

## 2018-07-03 NOTE — PLAN OF CARE
CALLED AND SCHED PROCEDURE,  ALSO CALLED AND SPOKE WITH PT, GAVE DATE AND TIME.     ----- Message from Chadd Hung MD sent at 7/3/2018  2:50 PM CDT -----  Please put her on the schedule for Monday for right ureteroscopy, laser litho, stent.  Case request is in     Problem: BH Overarching Goals  Goal: Adheres to Safety Considerations for Self and Others  Outcome: Ongoing (interventions implemented as appropriate)  Goal: Optimized Coping Skills in Response to Life Stressors  Outcome: Ongoing (interventions implemented as appropriate)  Goal: Develops/Participates in Therapeutic [place] to Support Successful Transition  Outcome: Ongoing (interventions implemented as appropriate)    Problem: Alteration in Thoughts and Perception  Goal: Verbalize thoughts and feelings associated with:  Outcome: Ongoing (interventions implemented as appropriate)  Goal: Refrain from acting on delusional thinking/internal stimuli  Outcome: Ongoing (interventions implemented as appropriate)  Goal: Agree to be compliant with medication regime, as prescribed and report medication side effects  Outcome: Ongoing (interventions implemented as appropriate)  Goal: Complete daily ADLs, including personal hygiene independently, as able  Outcome: Ongoing (interventions implemented as appropriate)

## 2020-04-20 NOTE — PROGRESS NOTES
11/13/2017    Chief Complaint: psychosis    Subjective:  Patient is a 62 y.o. female who was hospitalized for psychosis.   She continues to be struggling with AVH and delusions.    She lost hearing in her early 30's.  Her cochlear implant hearing aid was lost at the nursing home.  She thinks sister and nephew are dead though that is not true.  Sister believes that she may be over medicated and that is causing the hallucinations and delusions.  Sister denies that there was any history of this in the past.    Spoke with the DON at the nursing home.  She has always cried and moaned and walked the halls at night. She had delusions and hallucinations before the issue with the cochlear implant.  She uses the dry erase board to communicate.  She seems to have increase in moaning and crying and AH are worse and she does not seem to be getting better from this since losing the cochlear implant device.  She has also been pecking at her head since then.  She has appointment in December to try to get her hearing aid fixed.    Objective     Vital Signs    Temp:  [97.8 °F (36.6 °C)-98.3 °F (36.8 °C)] 98.3 °F (36.8 °C)  Heart Rate:  [94-95] 95  Resp:  [18] 18  BP: (121-185)/(65-73) 185/73    Physical Exam:   General Appearance: alert, appears stated age and cooperative,  Hygiene:   good  Gait & Station: Normal  Musculoskeletal: No tremors or abnormal involuntary movements    Mental Status Exam:   Cooperation:  Cooperative  Eye Contact:  Good  Psychomotor Behavior:  Appropriate  Mood: Anxious/Nervous and Worried  Affect:  mood-congruent  Speech:  Normal  Thought Process:  [place]  Associations: Goal Directed  Thought Content:     Mood congurent   Suicidal:  None   Homicidal:  None   Hallucinations:  Auditory and Visual   Delusion:  Various delusions present  Cognitive Functioning:   Consciousness: awake, alert and confused  Reliability:  poor  Insight:  Poor  Judgement:  Poor  Impulse Control:  Fair    Lab Results (last 24 hours)   [facility] Call Center    Phone Message    May a detailed message be left on voicemail: no     Reason for Call: Jeannine called from [employer] regarding disability hearing form that was faxed over on march 31. Please call Jeannine back at  ext [phone].     Action Taken: Message routed to:  [facility] ([facility]): id    Travel Screening: Not Applicable                                                                          ** No results found for the last 24 hours. **        Imaging Results (last 24 hours)     ** No results found for the last 24 hours. **          Medicine:   Current Facility-Administered Medications:   •  acetaminophen (TYLENOL) tablet 650 mg, 650 mg, Oral, Q4H PRN, Faisal Jones MD, 650 mg at 11/13/17 1034  •  aluminum-magnesium hydroxide-simethicone (MAALOX MAX) 400-400-40 MG/5ML suspension 15 mL, 15 mL, Oral, Q6H PRN, Faisal Jones MD, 15 mL at 11/11/17 0147  •  atorvastatin (LIPITOR) tablet 40 mg, 40 mg, Oral, Nightly, Magali Cooper MD, 40 mg at 11/12/17 2058  •  clonazePAM (KlonoPIN) tablet 0.5 mg, 0.5 mg, Oral, Q8H, Magali Cooper MD, 0.5 mg at 11/13/17 0612  •  cyclobenzaprine (FLEXERIL) tablet 10 mg, 10 mg, Oral, Daily, Magali Cooper MD, 10 mg at 11/13/17 0914  •  divalproex (DEPAKOTE) DR tablet 250 mg, 250 mg, Oral, Daily, Magali Cooper MD, 250 mg at 11/13/17 0910  •  divalproex (DEPAKOTE) DR tablet 500 mg, 500 mg, Oral, Nightly, Magali Cooper MD, 500 mg at 11/12/17 2059  •  DULoxetine (CYMBALTA) DR capsule 60 mg, 60 mg, Oral, Daily, Magali Cooper MD, 60 mg at 11/13/17 0910  •  fenofibrate (TRICOR) tablet 145 mg, 145 mg, Oral, Daily, Magali Cooper MD, 145 mg at 11/13/17 0910  •  fluticasone (FLONASE) 50 MCG/ACT nasal spray 2 spray, 2 spray, Each Nare, Daily, Magali Cooper MD, 2 spray at 11/13/17 0911  •  gabapentin (NEURONTIN) capsule 600 mg, 600 mg, Oral, Q8H, Magali Cooper MD, 600 mg at 11/13/17 0612  •  hydrOXYzine (VISTARIL) capsule 50 mg, 50 mg, Oral, Q6H PRN, Faisal Jones MD, 50 mg at 11/12/17 2058  •  ipratropium-albuterol (DUO-NEB) nebulizer solution 3 mL, 3 mL, Nebulization, 4x Daily - RT, Magali Cooper MD, 3 mL at 11/12/17 1945  •  levothyroxine (SYNTHROID, LEVOTHROID) tablet 100 mcg, 100 mcg, Oral, Q AM, Magali Cooper MD, 100 mcg at 11/13/17 0612  •  lidocaine (LIDODERM) 5 % 1 patch, 1 patch, Transdermal, Q24H, Magali Cooper MD, 1 patch at 11/13/17 0911  •   loperamide (IMODIUM) capsule 2 mg, 2 mg, Oral, 4x Daily PRN, Faisal Jones MD  •  magnesium hydroxide (MILK OF MAGNESIA) suspension 2400 mg/10mL 10 mL, 10 mL, Oral, Daily PRN, Faisal Jones MD  •  nicotine (NICODERM CQ) 14 MG/24HR patch 1 patch, 1 patch, Transdermal, Q24H, Faisal Jones MD, 1 patch at 11/13/17 0912  •  sertraline (ZOLOFT) tablet 100 mg, 100 mg, Oral, Daily, Magali Cooper MD, 100 mg at 11/13/17 0910  •  traMADol (ULTRAM) tablet 50 mg, 50 mg, Oral, Q6H PRN, Magali Cooper MD, 50 mg at 11/13/17 1033  •  traZODone (DESYREL) tablet 50 mg, 50 mg, Oral, Nightly PRN, Faisal Jones MD, 50 mg at 11/12/17 2100  •  triamcinolone (KENALOG) 0.1 % cream, , Topical, Q12H, Alhaji Elmore MD    Diagnoses/Assessment:   Principal Problem:    Major depressive disorder, recurrent, severe with psychotic features  Active Problems:    Bilateral deafness  Will rule out BPAD w/ psych features.    Treatment Plan:    1) Will continue care for the patient on the behavioral health unit at [facility] to ensure patient safety.  2) Will continue to provide treatment with the unit milieu, activities, therapies and psychopharmacological management.  3) Patient to be placed on or continued on  Q15 minute checks  and Suicide precautions.  4) Will continue medical management by Dr. Elmore.  5) Will order following labs: cmp, vpa, tsh, ammonia  6) Will make the following medication changes: taper the klonopin and zoloft.  Continue the cymbalta and depakote for now.  Augment with risperdal 0.5mg bid.  7) Will continue discharge planning as appropriate for patient.  8) Psychotherapy provided: none    Treatment plan and medication risks and benefits discussed with: Patient and Family    Faisal Jones MD  11/13/17  12:43 PM

## 2021-01-01 NOTE — NURSING NOTE
Patient has dozed off/on during night.  She remains pleasant, cooperative with staff.  Has remained in bed.   EOAE (evoked otoacoustic emission)

## 2025-07-02 NOTE — ED NOTES
Dr Carpio would like to see you again in about 4-6 weeks. We will schedule this with you today.    24HR BP monitoring.    Continue low sodium diet.    Monitor your blood pressures- please bring a log to your next office visit.     Pts nurse from [facility] reports that pt attempted to slit wrists about a year and a half ago and her behavior worsening since then, especially over the last 6 months.       Stephanie Villatoro RN  11/21/17 3295